# Patient Record
Sex: MALE | Employment: FULL TIME | URBAN - METROPOLITAN AREA
[De-identification: names, ages, dates, MRNs, and addresses within clinical notes are randomized per-mention and may not be internally consistent; named-entity substitution may affect disease eponyms.]

---

## 2023-05-11 ENCOUNTER — TELEMEDICINE (OUTPATIENT)
Dept: PRIMARY CARE | Facility: CLINIC | Age: 68
End: 2023-05-11
Payer: MEDICARE

## 2023-05-11 DIAGNOSIS — J06.9 UPPER RESPIRATORY TRACT INFECTION, UNSPECIFIED TYPE: Primary | ICD-10-CM

## 2023-05-11 PROCEDURE — 99442 PR PHYS/QHP TELEPHONE EVALUATION 11-20 MIN: CPT | Performed by: INTERNAL MEDICINE

## 2023-05-11 RX ORDER — LEVOCETIRIZINE DIHYDROCHLORIDE 5 MG/1
5 TABLET, FILM COATED ORAL EVERY EVENING
Qty: 14 TABLET | Refills: 1 | Status: SHIPPED | OUTPATIENT
Start: 2023-05-11 | End: 2023-09-11 | Stop reason: ALTCHOICE

## 2023-05-11 RX ORDER — CEFDINIR 300 MG/1
300 CAPSULE ORAL 2 TIMES DAILY
Qty: 14 CAPSULE | Refills: 0 | Status: SHIPPED | OUTPATIENT
Start: 2023-05-11 | End: 2023-05-18

## 2023-05-11 NOTE — PROGRESS NOTES
Subjective   Patient ID: Paco Gunderson is a 67 y.o. male who presents for No chief complaint on file..    HPI patient is attended by phone visit because he is complaining of cough congestion facial pain and sinus headache going on for the past 2 days.  He has tried over-the-counter cough suppressant and decongestant without any significant improvement in his symptoms.  He denies any fever, chills, shortness of breath, chest tightness and wheezing.  He has history of left inguinal hernia, osteoarthritis, COVID-19 infection, renal insufficiency, premature ventricular contractions and BPH        Review of Systems   Constitutional: Negative.    HENT:  Positive for congestion.    Eyes: Negative.    Respiratory:  Positive for cough.    Cardiovascular: Negative.    Gastrointestinal: Negative.    Endocrine: Negative.    Genitourinary: Negative.    Musculoskeletal: Negative.    Skin: Negative.    Allergic/Immunologic: Negative.    Neurological: Negative.    Hematological: Negative.    Psychiatric/Behavioral: Negative.         Objective   There were no vitals taken for this visit.    Physical Examnot done    Assessment/Plan    patient is started on cefdinir, levocetirizine for upper respiratory infection.  He is advised to drink fluids and take Tylenol for fever.  He will continue other home medications and will notify the clinic for any worsening of his symptoms.

## 2023-05-13 ASSESSMENT — ENCOUNTER SYMPTOMS
CARDIOVASCULAR NEGATIVE: 1
EYES NEGATIVE: 1
NEUROLOGICAL NEGATIVE: 1
PSYCHIATRIC NEGATIVE: 1
CONSTITUTIONAL NEGATIVE: 1
GASTROINTESTINAL NEGATIVE: 1
COUGH: 1
ALLERGIC/IMMUNOLOGIC NEGATIVE: 1
ENDOCRINE NEGATIVE: 1
MUSCULOSKELETAL NEGATIVE: 1
HEMATOLOGIC/LYMPHATIC NEGATIVE: 1

## 2023-08-22 LAB
ANION GAP IN SER/PLAS: 13 MMOL/L (ref 10–20)
CALCIUM (MG/DL) IN SER/PLAS: 9.7 MG/DL (ref 8.6–10.6)
CARBON DIOXIDE, TOTAL (MMOL/L) IN SER/PLAS: 28 MMOL/L (ref 21–32)
CHLORIDE (MMOL/L) IN SER/PLAS: 105 MMOL/L (ref 98–107)
CREATININE (MG/DL) IN SER/PLAS: 1.32 MG/DL (ref 0.5–1.3)
GFR MALE: 58 ML/MIN/1.73M2
GLUCOSE (MG/DL) IN SER/PLAS: 76 MG/DL (ref 74–99)
POTASSIUM (MMOL/L) IN SER/PLAS: 5.2 MMOL/L (ref 3.5–5.3)
SODIUM (MMOL/L) IN SER/PLAS: 141 MMOL/L (ref 136–145)
UREA NITROGEN (MG/DL) IN SER/PLAS: 17 MG/DL (ref 6–23)

## 2023-09-11 ENCOUNTER — OFFICE VISIT (OUTPATIENT)
Dept: PRIMARY CARE | Facility: CLINIC | Age: 68
End: 2023-09-11
Payer: MEDICARE

## 2023-09-11 VITALS
BODY MASS INDEX: 23.5 KG/M2 | HEART RATE: 64 BPM | OXYGEN SATURATION: 95 % | TEMPERATURE: 98.7 F | SYSTOLIC BLOOD PRESSURE: 143 MMHG | HEIGHT: 76 IN | WEIGHT: 193 LBS | DIASTOLIC BLOOD PRESSURE: 75 MMHG

## 2023-09-11 DIAGNOSIS — M17.0 OSTEOARTHRITIS OF BOTH KNEES, UNSPECIFIED OSTEOARTHRITIS TYPE: ICD-10-CM

## 2023-09-11 DIAGNOSIS — Z12.5 SCREENING FOR PROSTATE CANCER: ICD-10-CM

## 2023-09-11 DIAGNOSIS — I49.3 PVC (PREMATURE VENTRICULAR CONTRACTION): ICD-10-CM

## 2023-09-11 DIAGNOSIS — Z00.00 ROUTINE GENERAL MEDICAL EXAMINATION AT HEALTH CARE FACILITY: Primary | ICD-10-CM

## 2023-09-11 DIAGNOSIS — I10 HYPERTENSION, UNSPECIFIED TYPE: ICD-10-CM

## 2023-09-11 DIAGNOSIS — K40.90 LEFT INGUINAL HERNIA: ICD-10-CM

## 2023-09-11 DIAGNOSIS — N40.1 BENIGN PROSTATIC HYPERPLASIA WITH URINARY FREQUENCY: ICD-10-CM

## 2023-09-11 DIAGNOSIS — Z13.6 SCREENING FOR CARDIOVASCULAR CONDITION: ICD-10-CM

## 2023-09-11 DIAGNOSIS — Z12.12 SCREENING FOR COLORECTAL CANCER: ICD-10-CM

## 2023-09-11 DIAGNOSIS — Z12.11 SCREENING FOR COLORECTAL CANCER: ICD-10-CM

## 2023-09-11 DIAGNOSIS — R35.0 BENIGN PROSTATIC HYPERPLASIA WITH URINARY FREQUENCY: ICD-10-CM

## 2023-09-11 PROCEDURE — G0439 PPPS, SUBSEQ VISIT: HCPCS | Performed by: INTERNAL MEDICINE

## 2023-09-11 PROCEDURE — 99214 OFFICE O/P EST MOD 30 MIN: CPT | Performed by: INTERNAL MEDICINE

## 2023-09-11 PROCEDURE — 1159F MED LIST DOCD IN RCRD: CPT | Performed by: INTERNAL MEDICINE

## 2023-09-11 PROCEDURE — 1126F AMNT PAIN NOTED NONE PRSNT: CPT | Performed by: INTERNAL MEDICINE

## 2023-09-11 PROCEDURE — 3078F DIAST BP <80 MM HG: CPT | Performed by: INTERNAL MEDICINE

## 2023-09-11 PROCEDURE — 1160F RVW MEDS BY RX/DR IN RCRD: CPT | Performed by: INTERNAL MEDICINE

## 2023-09-11 PROCEDURE — 1170F FXNL STATUS ASSESSED: CPT | Performed by: INTERNAL MEDICINE

## 2023-09-11 PROCEDURE — 3077F SYST BP >= 140 MM HG: CPT | Performed by: INTERNAL MEDICINE

## 2023-09-11 RX ORDER — DICLOFENAC SODIUM 10 MG/G
4 GEL TOPICAL 4 TIMES DAILY
Qty: 100 G | Refills: 1 | Status: SHIPPED | OUTPATIENT
Start: 2023-09-11 | End: 2024-06-06 | Stop reason: WASHOUT

## 2023-09-11 RX ORDER — TAMSULOSIN HYDROCHLORIDE 0.4 MG/1
0.4 CAPSULE ORAL DAILY
Qty: 30 CAPSULE | Refills: 5 | Status: SHIPPED | OUTPATIENT
Start: 2023-09-11 | End: 2024-05-18

## 2023-09-11 RX ORDER — OMEPRAZOLE 20 MG/1
20 CAPSULE, DELAYED RELEASE ORAL
Qty: 30 CAPSULE | Refills: 0 | Status: SHIPPED
Start: 2023-09-11 | End: 2024-06-06 | Stop reason: WASHOUT

## 2023-09-11 RX ORDER — LISINOPRIL 10 MG/1
1 TABLET ORAL DAILY
COMMUNITY
Start: 2023-08-22 | End: 2023-10-19

## 2023-09-11 RX ORDER — OMEPRAZOLE 40 MG/1
40 CAPSULE, DELAYED RELEASE ORAL DAILY
COMMUNITY
End: 2023-09-11 | Stop reason: DRUGHIGH

## 2023-09-11 RX ORDER — METOPROLOL TARTRATE 50 MG/1
50 TABLET ORAL 2 TIMES DAILY
COMMUNITY
Start: 2023-08-22

## 2023-09-11 ASSESSMENT — PATIENT HEALTH QUESTIONNAIRE - PHQ9
SUM OF ALL RESPONSES TO PHQ9 QUESTIONS 1 AND 2: 0
2. FEELING DOWN, DEPRESSED OR HOPELESS: NOT AT ALL
SUM OF ALL RESPONSES TO PHQ9 QUESTIONS 1 AND 2: 0
2. FEELING DOWN, DEPRESSED OR HOPELESS: NOT AT ALL
1. LITTLE INTEREST OR PLEASURE IN DOING THINGS: NOT AT ALL
1. LITTLE INTEREST OR PLEASURE IN DOING THINGS: NOT AT ALL

## 2023-09-11 ASSESSMENT — COLUMBIA-SUICIDE SEVERITY RATING SCALE - C-SSRS: 1. IN THE PAST MONTH, HAVE YOU WISHED YOU WERE DEAD OR WISHED YOU COULD GO TO SLEEP AND NOT WAKE UP?: NO

## 2023-09-11 ASSESSMENT — ACTIVITIES OF DAILY LIVING (ADL)
BATHING: INDEPENDENT
TAKING_MEDICATION: INDEPENDENT
MANAGING_FINANCES: INDEPENDENT
GROCERY_SHOPPING: INDEPENDENT
DOING_HOUSEWORK: INDEPENDENT
DRESSING: INDEPENDENT

## 2023-09-11 ASSESSMENT — LIFESTYLE VARIABLES
SKIP TO QUESTIONS 9-10: 0
HOW MANY STANDARD DRINKS CONTAINING ALCOHOL DO YOU HAVE ON A TYPICAL DAY: 1 OR 2
HOW OFTEN DO YOU HAVE SIX OR MORE DRINKS ON ONE OCCASION: LESS THAN MONTHLY
AUDIT-C TOTAL SCORE: 2
HOW OFTEN DO YOU HAVE A DRINK CONTAINING ALCOHOL: MONTHLY OR LESS

## 2023-09-11 NOTE — PROGRESS NOTES
"Subjective   Reason for Visit: Paco Gunderson is an 68 y.o. male here for a Medicare Wellness visit.          Reviewed all medications by prescribing practitioner or clinical pharmacist (such as prescriptions, OTCs, herbal therapies and supplements) and documented in the medical record.    HPI patient presents to clinic for follow-up visit on history of left inguinal hernia, osteoarthritis, COVID-19 infection, renal insufficiency, arthritis,PAC's/premature ventricular contractions and BPH .  He has been complaining of urinary frequency and nocturia for the past several months.  He is also complaining of bilateral knee discomfort regarding arthritis of his knees.  He is also here for Medicare annual wellness exam.  Laboratory studies done shows serum creatinine of 1.32 GFR of 58 mL/min    Patient Care Team:  Curt Calvo MD as PCP - General  Curt Calvo MD as PCP - [employer] PCP     Review of Systems   Constitutional: Negative.    HENT: Negative.     Eyes: Negative.    Respiratory: Negative.     Cardiovascular: Negative.    Gastrointestinal: Negative.    Endocrine: Negative.    Genitourinary: Negative.    Musculoskeletal:  Positive for arthralgias.   Skin: Negative.    Allergic/Immunologic: Negative.    Neurological: Negative.    Hematological: Negative.    Psychiatric/Behavioral: Negative.         Objective   Vitals:  /75   Pulse 64   Temp 37.1 °C (98.7 °F)   Ht 1.93 m (6' 4\")   Wt 87.5 kg (193 lb)   SpO2 95%   BMI 23.49 kg/m²       Physical Exam  Constitutional:       Appearance: Normal appearance. He is normal weight.   HENT:      Right Ear: Tympanic membrane normal.      Left Ear: Tympanic membrane and ear canal normal.      Nose: Nose normal.   Neck:      Vascular: No carotid bruit.   Cardiovascular:      Rate and Rhythm: Normal rate.   Pulmonary:      Effort: No respiratory distress.      Breath sounds: No stridor. No wheezing.   Abdominal:      Palpations: Abdomen is soft.      " Tenderness: There is no abdominal tenderness. There is no guarding or rebound.      Comments: Reducible large left inguinal hernia   Skin:     Coloration: Skin is not jaundiced.   Neurological:      General: No focal deficit present.      Mental Status: He is alert and oriented to person, place, and time.   Psychiatric:         Mood and Affect: Mood normal.         Assessment/Plan   Problem List Items Addressed This Visit    None  Patient will be referred to general surgery regarding left inguinal hernia.  He is started on Flomax for symptoms of BPH.  He will be also started on diclofenac gel regarding arthritis of his knees.  He will be scheduled for CT cardiac scoring regarding screening for cardiovascular disease. He will be scheduled for routine blood work.  He will continue other medications and will return to clinic in 6 months for follow-up visit.

## 2023-09-11 NOTE — PROGRESS NOTES
"Subjective   Patient ID: Paco Gunderson is a 68 y.o. male who presents for Medicare Annual Wellness Visit Initial, Knee Pain (Right. Would like knee surgery ), and Hernia (Would like hernia groin surgery ).    HPI     Review of Systems    Objective   /75   Pulse 64   Temp 37.1 °C (98.7 °F)   Ht 1.93 m (6' 4\")   Wt 87.5 kg (193 lb)   SpO2 95%   BMI 23.49 kg/m²     Physical Exam    Assessment/Plan          "

## 2023-09-15 ASSESSMENT — ENCOUNTER SYMPTOMS
CARDIOVASCULAR NEGATIVE: 1
GASTROINTESTINAL NEGATIVE: 1
ENDOCRINE NEGATIVE: 1
NEUROLOGICAL NEGATIVE: 1
ALLERGIC/IMMUNOLOGIC NEGATIVE: 1
CONSTITUTIONAL NEGATIVE: 1
EYES NEGATIVE: 1
PSYCHIATRIC NEGATIVE: 1
RESPIRATORY NEGATIVE: 1
HEMATOLOGIC/LYMPHATIC NEGATIVE: 1
ARTHRALGIAS: 1

## 2023-09-27 LAB — NONINV COLON CA DNA+OCC BLD SCRN STL QL: POSITIVE

## 2023-09-29 DIAGNOSIS — R19.5 POSITIVE COLORECTAL CANCER SCREENING USING COLOGUARD TEST: Primary | ICD-10-CM

## 2023-10-18 ENCOUNTER — LAB (OUTPATIENT)
Dept: LAB | Facility: LAB | Age: 68
End: 2023-10-18
Payer: MEDICARE

## 2023-10-18 ENCOUNTER — TELEPHONE (OUTPATIENT)
Dept: CARDIOLOGY | Facility: CLINIC | Age: 68
End: 2023-10-18
Payer: MEDICARE

## 2023-10-18 DIAGNOSIS — I10 BENIGN ESSENTIAL HYPERTENSION: Primary | ICD-10-CM

## 2023-10-18 DIAGNOSIS — I10 BENIGN ESSENTIAL HYPERTENSION: ICD-10-CM

## 2023-10-18 LAB
ANION GAP SERPL CALC-SCNC: 14 MMOL/L (ref 10–20)
BUN SERPL-MCNC: 22 MG/DL (ref 6–23)
CALCIUM SERPL-MCNC: 9.7 MG/DL (ref 8.6–10.6)
CHLORIDE SERPL-SCNC: 104 MMOL/L (ref 98–107)
CO2 SERPL-SCNC: 26 MMOL/L (ref 21–32)
CREAT SERPL-MCNC: 1.38 MG/DL (ref 0.5–1.3)
GFR SERPL CREATININE-BSD FRML MDRD: 55 ML/MIN/1.73M*2
GLUCOSE SERPL-MCNC: 126 MG/DL (ref 74–99)
POTASSIUM SERPL-SCNC: 4.2 MMOL/L (ref 3.5–5.3)
SODIUM SERPL-SCNC: 140 MMOL/L (ref 136–145)

## 2023-10-18 PROCEDURE — 80048 BASIC METABOLIC PNL TOTAL CA: CPT

## 2023-10-18 PROCEDURE — 36415 COLL VENOUS BLD VENIPUNCTURE: CPT

## 2023-10-18 NOTE — TELEPHONE ENCOUNTER
Lab results still not available. TCT patient to pass along Dr. Payne's recommendation for ER evaluation if true syncope. Patient states 'the ER is where people go to die'. Offered him an office visit tomorrow as patient is going out of town on Friday. Patient prefers to see Dr. Payne tomorrow in clinic. Directions given. Will review lab results at that time

## 2023-10-18 NOTE — TELEPHONE ENCOUNTER
"TCT patient who states he woke up at 5am but never 'really woke up\", he tried to get out of bed but felt weak and crawled to the door, calling for help. States when he reached the hallway and the light, he started to feel better. Denies nightmares or sleep walking. Notes reviewed. Patient never had repeat BMP drawn after starting lisinopril. Advised he get blood work today. States understanding and agreement. Orders forwarded to Dr. Payne to send  "

## 2023-10-18 NOTE — TELEPHONE ENCOUNTER
"Pt called today stating that he had an episode of \"blacking out\" he said he didn't know what to do     He is asking if meds need adjusted  "

## 2023-10-19 ENCOUNTER — OFFICE VISIT (OUTPATIENT)
Dept: CARDIOLOGY | Facility: HOSPITAL | Age: 68
End: 2023-10-19
Payer: MEDICARE

## 2023-10-19 VITALS
BODY MASS INDEX: 23.62 KG/M2 | SYSTOLIC BLOOD PRESSURE: 159 MMHG | WEIGHT: 194 LBS | HEIGHT: 76 IN | DIASTOLIC BLOOD PRESSURE: 80 MMHG | HEART RATE: 70 BPM

## 2023-10-19 DIAGNOSIS — I10 BENIGN ESSENTIAL HYPERTENSION: Primary | ICD-10-CM

## 2023-10-19 DIAGNOSIS — Z00.00 HEALTHCARE MAINTENANCE: ICD-10-CM

## 2023-10-19 PROCEDURE — 99213 OFFICE O/P EST LOW 20 MIN: CPT | Performed by: HOSPITALIST

## 2023-10-19 PROCEDURE — 1036F TOBACCO NON-USER: CPT | Performed by: HOSPITALIST

## 2023-10-19 PROCEDURE — 3079F DIAST BP 80-89 MM HG: CPT | Performed by: HOSPITALIST

## 2023-10-19 PROCEDURE — 1160F RVW MEDS BY RX/DR IN RCRD: CPT | Performed by: HOSPITALIST

## 2023-10-19 PROCEDURE — 3077F SYST BP >= 140 MM HG: CPT | Performed by: HOSPITALIST

## 2023-10-19 PROCEDURE — 1126F AMNT PAIN NOTED NONE PRSNT: CPT | Performed by: HOSPITALIST

## 2023-10-19 PROCEDURE — 1159F MED LIST DOCD IN RCRD: CPT | Performed by: HOSPITALIST

## 2023-10-19 RX ORDER — LISINOPRIL 20 MG/1
20 TABLET ORAL DAILY
Qty: 30 TABLET | Refills: 11 | Status: SHIPPED | OUTPATIENT
Start: 2023-10-19 | End: 2024-06-06 | Stop reason: WASHOUT

## 2023-10-19 NOTE — PATIENT INSTRUCTIONS
Thank you much for visiting us today.    Your incident yesterday is not cardiac in origin.  Please talk to your primary care physician.    Your blood pressure still elevated, we are going to increase your lisinopril to 20 mg once daily.    We will see you back in 1 year.  Please call our office at [phone] if you have any questions.  We are going to check your lipid panel [cholesterol].

## 2023-10-31 DIAGNOSIS — R12 HEARTBURN: ICD-10-CM

## 2023-10-31 RX ORDER — OMEPRAZOLE 40 MG/1
40 CAPSULE, DELAYED RELEASE ORAL DAILY
Qty: 90 CAPSULE | Refills: 2 | Status: SHIPPED | OUTPATIENT
Start: 2023-10-31 | End: 2023-11-29 | Stop reason: ALTCHOICE

## 2023-11-29 ENCOUNTER — OFFICE VISIT (OUTPATIENT)
Dept: GASTROENTEROLOGY | Facility: CLINIC | Age: 68
End: 2023-11-29
Payer: MEDICARE

## 2023-11-29 VITALS
SYSTOLIC BLOOD PRESSURE: 164 MMHG | HEIGHT: 76 IN | WEIGHT: 194 LBS | BODY MASS INDEX: 23.62 KG/M2 | DIASTOLIC BLOOD PRESSURE: 62 MMHG | HEART RATE: 81 BPM

## 2023-11-29 DIAGNOSIS — R19.5 POSITIVE COLORECTAL CANCER SCREENING USING COLOGUARD TEST: Primary | ICD-10-CM

## 2023-11-29 PROCEDURE — 1126F AMNT PAIN NOTED NONE PRSNT: CPT | Performed by: NURSE PRACTITIONER

## 2023-11-29 PROCEDURE — 3078F DIAST BP <80 MM HG: CPT | Performed by: NURSE PRACTITIONER

## 2023-11-29 PROCEDURE — 1036F TOBACCO NON-USER: CPT | Performed by: NURSE PRACTITIONER

## 2023-11-29 PROCEDURE — 1160F RVW MEDS BY RX/DR IN RCRD: CPT | Performed by: NURSE PRACTITIONER

## 2023-11-29 PROCEDURE — 1159F MED LIST DOCD IN RCRD: CPT | Performed by: NURSE PRACTITIONER

## 2023-11-29 PROCEDURE — 3077F SYST BP >= 140 MM HG: CPT | Performed by: NURSE PRACTITIONER

## 2023-11-29 PROCEDURE — 99203 OFFICE O/P NEW LOW 30 MIN: CPT | Performed by: NURSE PRACTITIONER

## 2023-11-29 NOTE — PROGRESS NOTES
Subjective   Patient ID: Paco Gunderson is a 68 y.o. male who presents for Positive Cologuard .    68 year old male who is here for positive cologuard. He has never had a Colonoscopy before. Reports that he occasionally sees blood in his stool when he wipes and attributes that to hemorrhoids. He reports no change in bowel habits, abdominal pain, unintentional weight loss. His father was in his late 80s when he passed, due to multiple reasons such as cardiac and alzheimers but reports there was potentially colon cancer as well.     @Abdominal pain-No      Bloating-No  Abdominal swelling-No     Burping-No       Trouble swallowing-No      Painful swallowing-No     Feeling full after small meal-No     Heartburn-Yes (occ - takes Omeprazole)     Nausea-No       Regurgitation-No  Vomiting-No  Vomiting blood-N/A  Vomiting coffee grounds-N/A  Constipation-No  Diarrhea-No  Fecal incontinence-No  Fecal urgency-No   BRBPR-Yes  Black stools-No  Maroon stools-No   Unintentional weight loss-No   Have you had a Colonoscopy-No   Have you had an EGD-No     Objective   Physical Exam  @Constitutional: well nourished, well appearing. NAD. Alert and cooperative  Skin: no jaundice   Eyes: anicteric, normal conjunctiva  ENT: MMM  Pulmonary: easy and nonlabored on RA  Abdomen: soft, NT, ND. No ascites.  MSK: MAEx4  Extremities: no edema  Neuro: aaox3  Psych: appropriate mood and behavior     No visits with results within 1 Month(s) from this visit.   Latest known visit with results is:   Lab on 10/18/2023   Component Date Value Ref Range Status    Glucose 10/18/2023 126 (H)  74 - 99 mg/dL Final    Sodium 10/18/2023 140  136 - 145 mmol/L Final    Potassium 10/18/2023 4.2  3.5 - 5.3 mmol/L Final    Chloride 10/18/2023 104  98 - 107 mmol/L Final    Bicarbonate 10/18/2023 26  21 - 32 mmol/L Final    Anion Gap 10/18/2023 14  10 - 20 mmol/L Final    Urea Nitrogen 10/18/2023 22  6 - 23 mg/dL Final    Creatinine 10/18/2023 1.38 (H)  0.50 - 1.30  mg/dL Final    eGFR 10/18/2023 55 (L)  >60 mL/min/1.73m*2 Final    Calculations of estimated GFR are performed using the 2021 CKD-EPI Study Refit equation without the race variable for the IDMS-Traceable creatinine methods.  https://jasn.asnjournals.org/content/early/2021/09/22/ASN.6849218904    Calcium 10/18/2023 9.7  8.6 - 10.6 mg/dL Final       Assessment/Plan   68 year old male here with positive Cologuard test.     -Colonoscopy ordered at [facility], pt request

## 2023-11-30 DIAGNOSIS — R19.5 POSITIVE COLORECTAL CANCER SCREENING USING COLOGUARD TEST: Primary | ICD-10-CM

## 2023-11-30 RX ORDER — POLYETHYLENE GLYCOL 3350, SODIUM SULFATE ANHYDROUS, SODIUM BICARBONATE, SODIUM CHLORIDE, POTASSIUM CHLORIDE 236; 22.74; 6.74; 5.86; 2.97 G/4L; G/4L; G/4L; G/4L; G/4L
4000 POWDER, FOR SOLUTION ORAL ONCE
Qty: 4000 ML | Refills: 0 | Status: SHIPPED | OUTPATIENT
Start: 2023-11-30 | End: 2023-11-30

## 2023-12-11 ENCOUNTER — APPOINTMENT (OUTPATIENT)
Dept: GASTROENTEROLOGY | Facility: CLINIC | Age: 68
End: 2023-12-11
Payer: MEDICARE

## 2023-12-21 ENCOUNTER — APPOINTMENT (OUTPATIENT)
Dept: GASTROENTEROLOGY | Facility: CLINIC | Age: 68
End: 2023-12-21
Payer: MEDICARE

## 2024-01-23 ENCOUNTER — TELEPHONE (OUTPATIENT)
Dept: PRIMARY CARE | Facility: CLINIC | Age: 69
End: 2024-01-23
Payer: MEDICARE

## 2024-01-23 DIAGNOSIS — M17.0 ARTHRITIS OF BOTH KNEES: Primary | ICD-10-CM

## 2024-02-06 ENCOUNTER — HOSPITAL ENCOUNTER (OUTPATIENT)
Dept: RADIOLOGY | Facility: CLINIC | Age: 69
Discharge: HOME | End: 2024-02-06
Payer: MEDICARE

## 2024-02-06 ENCOUNTER — OFFICE VISIT (OUTPATIENT)
Dept: ORTHOPEDIC SURGERY | Facility: CLINIC | Age: 69
End: 2024-02-06
Payer: MEDICARE

## 2024-02-06 DIAGNOSIS — M25.561 PAIN IN BOTH KNEES, UNSPECIFIED CHRONICITY: ICD-10-CM

## 2024-02-06 DIAGNOSIS — M25.562 PAIN IN BOTH KNEES, UNSPECIFIED CHRONICITY: ICD-10-CM

## 2024-02-06 DIAGNOSIS — M17.0 PRIMARY OSTEOARTHRITIS OF BOTH KNEES: Primary | ICD-10-CM

## 2024-02-06 PROCEDURE — 99203 OFFICE O/P NEW LOW 30 MIN: CPT | Performed by: STUDENT IN AN ORGANIZED HEALTH CARE EDUCATION/TRAINING PROGRAM

## 2024-02-06 PROCEDURE — 1126F AMNT PAIN NOTED NONE PRSNT: CPT | Performed by: STUDENT IN AN ORGANIZED HEALTH CARE EDUCATION/TRAINING PROGRAM

## 2024-02-06 PROCEDURE — 73564 X-RAY EXAM KNEE 4 OR MORE: CPT | Mod: 50

## 2024-02-06 PROCEDURE — 1036F TOBACCO NON-USER: CPT | Performed by: STUDENT IN AN ORGANIZED HEALTH CARE EDUCATION/TRAINING PROGRAM

## 2024-02-07 NOTE — PROGRESS NOTES
Paco Gunderson is a 68 y.o.  year-old  female. he is a new patient to our office and presents with a chief complaint of Bilateral knee pain. Symptoms began years ago, progressive in nature. he pain is worse with activity. They describe the symptoms as pain and swelling. Treatment to date has been ice/nsaids/activity modification without adequate relief.  He notes someone told him 20 years ago that he needs a knee replacement but has not had any other formal treatment aside from those above.      Past Medical, Family, and Social History reviewed and inlcude:[none] all other history pertinent to the presenting problem  Review of Systems  A complete review of systems was conducted, pertinent only to the HPI noted above.  Constitutional: None  Eyes: No additions to above history  Ears, Nose, Throat: No additions to above history  Cardiovascular: No additions to above history  Respiratory: No additions to above history  GI: No additions to above history  : No additions to above history  Skin/Neuro: No additions to above history  Endocrine/Heme/Lymph: No additions to above history  Immunologic: No additions to above history  Psychiatric: No additions to above history  Musculoskeletal: see above  GEN: Alert and Oriented x 3  Constitutional: Well appearing [male], in no apparent distress.  Eyes: sclera anicteric  ENT: hearing appropriate for normal conversation, neck appears symmetric with no gross thyromegaly  Pulm: No labored breathing, no wheezing  CVS: Regular rate and rhythm  Skin: No rashes, erythema, or induration around knee    MUSCULOSKELETAL EXAM:     Bilateral KNEE:  ROM:  [0]/ [0] / 120  Effusion: [none]  Alignment: [neutral]      Sensation intact bilaterally sural/saphenous/sp/dp/tibial nerve bilaterally  Motor 5/5 knee flexion/extension/foot DF/PF/EHL/FHL bilaterally  Palpable/symmetric DP and PT pulse bilaterally      PATELLAR/EXTENSOR MECHANISM:  KNEE:  Patellar Crepitus: yes  Patellar Compression  Pain:yes  Patellar Apprehension: [no]  Extensor Mechanism: [intact]  Straight Leg Raise: fair      LIGAMENTS:  ACL: Lachmans: [negative]  PCL: [stable]  Valgus at 0 degrees: [stable]  Valgus at 30 degrees: [stable]  Varus at 0 degrees: [stable]  Varus at 30 degrees: [stable]    MENISCUS EXAM:  Joint Line Tenderness:medial joint line tenderness  McMurrays: [negative]  Pain with Deep Flexion: [No]    Xrays independently interpreted and reviewed: Advanced bilateral knee osteoarthritis    The patient history, physical examination and imaging studies are consistent with knee arthritis. The treatment options including NSAIDs, activity modification, PT (including the importance of obtaining full motion), and weight loss were discussed at length today. I have also suggested a potential for IA injection with cortisone but he would like to hold off at this moment.  I have discussed the potential need for arthroplasty in the future. The patient acknowledged the current plan.     He may follow-up if he desires corticosteroid injections at any time.

## 2024-02-20 ENCOUNTER — APPOINTMENT (OUTPATIENT)
Dept: CARDIOLOGY | Facility: CLINIC | Age: 69
End: 2024-02-20
Payer: MEDICARE

## 2024-02-22 ENCOUNTER — APPOINTMENT (OUTPATIENT)
Dept: ORTHOPEDIC SURGERY | Facility: CLINIC | Age: 69
End: 2024-02-22
Payer: MEDICARE

## 2024-03-11 ENCOUNTER — APPOINTMENT (OUTPATIENT)
Dept: PRIMARY CARE | Facility: CLINIC | Age: 69
End: 2024-03-11
Payer: MEDICARE

## 2024-03-12 ENCOUNTER — HOSPITAL ENCOUNTER (OUTPATIENT)
Dept: OPERATING ROOM | Facility: CLINIC | Age: 69
Setting detail: OUTPATIENT SURGERY
Discharge: HOME | End: 2024-03-12
Payer: MEDICARE

## 2024-03-12 VITALS
WEIGHT: 192.9 LBS | SYSTOLIC BLOOD PRESSURE: 121 MMHG | BODY MASS INDEX: 23.49 KG/M2 | OXYGEN SATURATION: 95 % | HEIGHT: 76 IN | DIASTOLIC BLOOD PRESSURE: 75 MMHG | RESPIRATION RATE: 16 BRPM | TEMPERATURE: 98.6 F | HEART RATE: 66 BPM

## 2024-03-12 DIAGNOSIS — R19.5 POSITIVE COLORECTAL CANCER SCREENING USING COLOGUARD TEST: Primary | ICD-10-CM

## 2024-03-12 PROCEDURE — 3700000012 HC SEDATION LEVEL 5+ TIME - INITIAL 15 MINUTES 5/> YEARS

## 2024-03-12 PROCEDURE — 7100000010 HC PHASE TWO TIME - EACH INCREMENTAL 1 MINUTE

## 2024-03-12 PROCEDURE — 45385 COLONOSCOPY W/LESION REMOVAL: CPT | Performed by: INTERNAL MEDICINE

## 2024-03-12 PROCEDURE — 3700000013 HC SEDATION LEVEL 5+ TIME - EACH ADDITIONAL 15 MINUTES

## 2024-03-12 PROCEDURE — 3600000002 HC OR TIME - INITIAL BASE CHARGE - PROCEDURE LEVEL TWO

## 2024-03-12 PROCEDURE — 7100000009 HC PHASE TWO TIME - INITIAL BASE CHARGE

## 2024-03-12 PROCEDURE — 88305 TISSUE EXAM BY PATHOLOGIST: CPT | Performed by: PATHOLOGY

## 2024-03-12 PROCEDURE — 2500000004 HC RX 250 GENERAL PHARMACY W/ HCPCS (ALT 636 FOR OP/ED): Performed by: INTERNAL MEDICINE

## 2024-03-12 PROCEDURE — 88305 TISSUE EXAM BY PATHOLOGIST: CPT | Mod: TC,59,SUR | Performed by: INTERNAL MEDICINE

## 2024-03-12 PROCEDURE — 3600000007 HC OR TIME - EACH INCREMENTAL 1 MINUTE - PROCEDURE LEVEL TWO

## 2024-03-12 RX ORDER — FENTANYL CITRATE 50 UG/ML
INJECTION, SOLUTION INTRAMUSCULAR; INTRAVENOUS AS NEEDED
Status: COMPLETED | OUTPATIENT
Start: 2024-03-12 | End: 2024-03-12

## 2024-03-12 RX ORDER — MIDAZOLAM HYDROCHLORIDE 1 MG/ML
INJECTION, SOLUTION INTRAMUSCULAR; INTRAVENOUS AS NEEDED
Status: COMPLETED | OUTPATIENT
Start: 2024-03-12 | End: 2024-03-12

## 2024-03-12 RX ORDER — SODIUM CHLORIDE, SODIUM LACTATE, POTASSIUM CHLORIDE, CALCIUM CHLORIDE 600; 310; 30; 20 MG/100ML; MG/100ML; MG/100ML; MG/100ML
20 INJECTION, SOLUTION INTRAVENOUS CONTINUOUS
Status: CANCELLED | OUTPATIENT
Start: 2024-03-12

## 2024-03-12 RX ADMIN — MIDAZOLAM 1 MG: 1 INJECTION INTRAMUSCULAR; INTRAVENOUS at 09:56

## 2024-03-12 RX ADMIN — MIDAZOLAM 1 MG: 1 INJECTION INTRAMUSCULAR; INTRAVENOUS at 09:46

## 2024-03-12 RX ADMIN — FENTANYL CITRATE 25 MCG: 50 INJECTION, SOLUTION INTRAMUSCULAR; INTRAVENOUS at 09:46

## 2024-03-12 RX ADMIN — MIDAZOLAM 2 MG: 1 INJECTION INTRAMUSCULAR; INTRAVENOUS at 09:41

## 2024-03-12 RX ADMIN — FENTANYL CITRATE 50 MCG: 50 INJECTION, SOLUTION INTRAMUSCULAR; INTRAVENOUS at 09:39

## 2024-03-12 RX ADMIN — FENTANYL CITRATE 50 MCG: 50 INJECTION, SOLUTION INTRAMUSCULAR; INTRAVENOUS at 09:41

## 2024-03-12 RX ADMIN — MIDAZOLAM 2 MG: 1 INJECTION INTRAMUSCULAR; INTRAVENOUS at 09:39

## 2024-03-12 RX ADMIN — FENTANYL CITRATE 25 MCG: 50 INJECTION, SOLUTION INTRAMUSCULAR; INTRAVENOUS at 09:56

## 2024-03-12 ASSESSMENT — COLUMBIA-SUICIDE SEVERITY RATING SCALE - C-SSRS
6. HAVE YOU EVER DONE ANYTHING, STARTED TO DO ANYTHING, OR PREPARED TO DO ANYTHING TO END YOUR LIFE?: NO
2. HAVE YOU ACTUALLY HAD ANY THOUGHTS OF KILLING YOURSELF?: NO
1. IN THE PAST MONTH, HAVE YOU WISHED YOU WERE DEAD OR WISHED YOU COULD GO TO SLEEP AND NOT WAKE UP?: NO

## 2024-03-12 ASSESSMENT — PAIN SCALES - GENERAL
PAINLEVEL_OUTOF10: 0 - NO PAIN

## 2024-03-12 ASSESSMENT — PAIN - FUNCTIONAL ASSESSMENT: PAIN_FUNCTIONAL_ASSESSMENT: 0-10

## 2024-03-12 NOTE — DISCHARGE INSTRUCTIONS
Patient Instructions after a Colonoscopy      The anesthetics, sedatives or narcotics which were given to you today will be acting in your body for the next 24 hours, so you might feel a little sleepy or groggy.  This feeling should slowly wear off. Carefully read and follow the instructions.     You received sedation today:  - Do not drive or operate any machinery or power tools of any kind.   - No alcoholic beverages today, not even beer or wine.  - Do not make any important decisions or sign any legal documents.  - No over the counter medications that contain alcohol or that may cause drowsiness.  - Do not make any important decisions or sign any legal documents.    While it is common to experience mild to moderate abdominal distention, gas, or belching after your procedure, if any of these symptoms occur following discharge from the GI Lab or within one week of having your procedure, call the [facility] to be advised whether a visit to your nearest Urgent Care or Emergency Department is indicated.  Take this paper with you if you go.     - If you develop an allergic reaction to the medications that were given during your procedure such as difficulty breathing, rash, hives, severe nausea, vomiting or lightheadedness.  - If you experience chest pain, shortness of breath, severe abdominal pain, fevers and chills.  -If you develop signs and symptoms of bleeding such as blood in your spit, if your stools turn black, tarry, or bloody  - If you have not urinated within 8 hours following your procedure.  - If your IV site becomes painful, red, inflamed, or looks infected.    If you received a biopsy/polypectomy/sphincterotomy the following instructions apply below:    __ Do not use Aspirin containing products, non-steroidal medications or anti-coagulants for one week following your procedure. (Examples of these types of medications are: Advil, Arthrotec, Aleve, Coumadin, Ecotrin, Heparin, Ibuprofen,  Indocin, Motrin, Naprosyn, Nuprin, Plavix, Vioxx, and Voltarin, or their generic forms.  This list is not all-inclusive.  Check with your physician or pharmacist before resuming medications.)   __ Eat a soft diet today.  Avoid foods that are poorly digested for the next 24 hours.  These foods would include: nuts, beans, lettuce, red meats, and fried foods. Start with liquids and advance your diet as tolerated, gradually work up to eating solids.   __ Do not have a Barium Study or Enema for one week.    Your physician recommends the additional following instructions:    -You have a contact number available for emergencies. The signs and symptoms of potential delayed complications were discussed with you. You may return to normal activities tomorrow.  -Resume your previous diet.  -Continue your present medications.   -We are waiting for your pathology results.  -Your physician has recommended a repeat colonoscopy (date to be determined after pending pathology results are reviewed) for surveillance based on pathology results.  -The findings and recommendations have been discussed with you.  -The findings and recommendations were discussed with your family.  - Please see Medication Reconciliation Form for new medication/medications prescribed.       If you experience any problems or have any questions following discharge from the GI Lab, please call: [phone]

## 2024-03-12 NOTE — H&P
"History Of Present Illness  Paco Gunderson is a 68 y.o. male presenting with + Cologuard for colonoscopy.     Past Medical History  Past Medical History:   Diagnosis Date    Encounter for general adult medical examination without abnormal findings 12/28/2020    Encounter for preventive health examination    Personal history of other diseases of male genital organs     History of prostate disorder    Personal history of other diseases of the musculoskeletal system and connective tissue     History of arthritis    Personal history of other specified conditions 11/01/2022    History of palpitations     Surgical History  Past Surgical History:   Procedure Laterality Date    HERNIA REPAIR  01/09/2018    Hernia Repair    OTHER SURGICAL HISTORY  02/13/2018    Primary Repair Of Knee Ligament Cruciate Posterior     Social History  He reports that he quit smoking about 41 years ago. His smoking use included cigarettes. He has never used smokeless tobacco. He reports current alcohol use. He reports that he does not use drugs.    Family History  Father - suspected colon cancer     Allergies  No Known Allergies  Review of Systems  A 10+ point review of systems was completed and otherwise negative.    Pre-sedation Evaluation:  ASA Classification - ASA 2 - Patient with mild systemic disease with no functional limitations  Mallampati Score - II (hard and soft palate, upper portion of tonsils anduvula visible)    Physical Exam  CONSTITUTIONAL: no acute distress, appears stated age  PULMONARY: clear to auscultation bilaterally  CARDIOVASCULAR: regular rate and rhythm  ABDOMEN: soft, non-tender  NEUROLOGIC: alert and oriented to person/place/time       Last Recorded Vitals  Blood pressure 159/78, pulse 77, temperature 36.5 °C (97.7 °F), temperature source Temporal, resp. rate 17, height 1.93 m (6' 4\"), weight 87.5 kg (192 lb 14.4 oz), SpO2 96 %.     Assessment/Plan   Will proceed with colonoscopy for further evaluation of positive " cologuard     PTA/Current Medications:  (Not in a hospital admission)    Current Outpatient Medications   Medication Sig Dispense Refill    diclofenac sodium (Voltaren) 1 % gel gel Apply 1 Application topically 4 times a day. 100 g 1    lisinopril 20 mg tablet Take 1 tablet (20 mg) by mouth once daily. 30 tablet 11    metoprolol tartrate (Lopressor) 25 mg tablet Take 1 tablet (25 mg) by mouth 2 times a day.      tamsulosin (Flomax) 0.4 mg 24 hr capsule Take 1 capsule (0.4 mg) by mouth once daily. (Patient taking differently: Take 1 capsule (0.4 mg) by mouth 2 times a week.) 30 capsule 5    omeprazole (PriLOSEC) 20 mg DR capsule Take 1 capsule (20 mg) by mouth once daily in the morning. Take before meals. 30 capsule 0     No current facility-administered medications for this encounter.     Uzair Mckeon MD   mother

## 2024-03-13 ASSESSMENT — PAIN SCALES - GENERAL: PAINLEVEL_OUTOF10: 0 - NO PAIN

## 2024-03-21 LAB
LABORATORY COMMENT REPORT: NORMAL
PATH REPORT.FINAL DX SPEC: NORMAL
PATH REPORT.GROSS SPEC: NORMAL
PATH REPORT.TOTAL CANCER: NORMAL

## 2024-03-21 NOTE — RESULT ENCOUNTER NOTE
The polyps removed from your colon were adenomas (benign but precancerous).  The recommendation is to repeat colonoscopy in 3 years.      Uzair Mckeon MD

## 2024-04-01 ENCOUNTER — OFFICE VISIT (OUTPATIENT)
Dept: SURGERY | Facility: CLINIC | Age: 69
End: 2024-04-01
Payer: MEDICARE

## 2024-04-01 VITALS
DIASTOLIC BLOOD PRESSURE: 75 MMHG | OXYGEN SATURATION: 98 % | BODY MASS INDEX: 24.1 KG/M2 | WEIGHT: 198 LBS | SYSTOLIC BLOOD PRESSURE: 125 MMHG | HEART RATE: 77 BPM | TEMPERATURE: 98.4 F

## 2024-04-01 DIAGNOSIS — K40.91 RECURRENT INGUINAL HERNIA OF LEFT SIDE WITHOUT OBSTRUCTION OR GANGRENE: Primary | ICD-10-CM

## 2024-04-01 PROCEDURE — 99213 OFFICE O/P EST LOW 20 MIN: CPT | Performed by: SURGERY

## 2024-04-01 PROCEDURE — 3074F SYST BP LT 130 MM HG: CPT | Performed by: SURGERY

## 2024-04-01 PROCEDURE — 1159F MED LIST DOCD IN RCRD: CPT | Performed by: SURGERY

## 2024-04-01 PROCEDURE — 3078F DIAST BP <80 MM HG: CPT | Performed by: SURGERY

## 2024-04-01 PROCEDURE — 1126F AMNT PAIN NOTED NONE PRSNT: CPT | Performed by: SURGERY

## 2024-04-01 ASSESSMENT — ENCOUNTER SYMPTOMS: DEPRESSION: 0

## 2024-04-01 ASSESSMENT — PAIN SCALES - GENERAL: PAINLEVEL: 0-NO PAIN

## 2024-04-01 NOTE — PROGRESS NOTES
"Subjective   Patient ID: Paco Gunderson is a 68 y.o. male who presents for [facility] for Hernia ..  HPI  Patient is a 68-year-old gentleman who is referred for evaluation and treatment of the left inguinal hernia.  Has history of undergoing bilateral laparoscopic inguinal hernia repair back in 2000.   For several years he has noted to have a recurrence of his left inguinal hernia.  Fairly asymptomatic.  Recently did undergo colonoscopy and he states that after the colonoscopy the hernia has become more prominent.  Also he has a history of a left-sided varicocele diagnosed as a teenager.  His only symptoms from this was left testicle that \"hung very low \".  Since the colonoscopy his left testicle has retracted to be just about the same height as the right testicle.        Review of Systems     On review of systems patient denies any weight loss, fever, change in bowel habits, melena, hematochezia, coronary artery disease, diabetes,  , TIA/CVA, bleeding, jaundice, pancreatitis, hepatitis.    Patient does not smoke. Patient does not drink      Works in      Past Medical History:   Diagnosis Date    Encounter for general adult medical examination without abnormal findings 12/28/2020    Encounter for preventive health examination    Personal history of other diseases of male genital organs     History of prostate disorder    Personal history of other diseases of the musculoskeletal system and connective tissue     History of arthritis    Personal history of other specified conditions 11/01/2022    History of palpitations    PMH of HTN, COVID x2 last in 05/2022, BPH, arthritis, CKD, PACs/PVCs, and GERD, who saw me     Past Surgical History:   Procedure Laterality Date    HERNIA REPAIR  01/09/2018    Hernia Repair    OTHER SURGICAL HISTORY  02/13/2018    Primary Repair Of Knee Ligament Cruciate Posterior    Lap bilateral  inguinal hernia repair with mesh 2000.  He brings a picture that was taken at the time " "of the surgery on the left side showing what appears to be a large direct inguinal hernia and another picture showing large piece of mesh with some metal screws tacks        Objective     Physical Exam    Well-nourished, well-developed. In no distress  Alert and oriented x 3  Lungs are clear to auscultation bilaterally.  Cardiac exam is regular rhythm and rate.  Abdomen is soft nontender nondistended.  Neurologic exam is without focal deficit.  Small to moderate-sized recurrent left inguinal hernia.  Reduces easily.  Otherwise genitalia to include testicles are normally positioned  Assessment/Plan     Impression: Recurrent left inguinal hernia.  Symptoms are vague but no pain.  He has been living comfortably with the hernia with a truss.    I did recommend and offer robotic recurrent left inguinal hernia repair with mesh.  We discussed the procedure in detail.  At this time he is not terribly interested in surgery and states \"I do not want to do it \".  Feel that a watchful waiting approach would not be unreasonable given the fact he is asymptomatic.  I have asked him to follow-up with me in 4 months, or sooner should he change his mind regarding surgery.       "

## 2024-04-01 NOTE — LETTER
"April 1, 2024     Curt Calvo MD  [address] [place] [address]   [place] [facility], [address]  [place] [address]    Patient: Paco Gunderson   YOB: 1955   Date of Visit: 4/1/2024       Dear Dr. Curt Calvo MD:    Thank you for referring Paco Gunderson to me for evaluation. Below are my notes for this consultation.  If you have questions, please do not hesitate to call me. I look forward to following your patient along with you.       Sincerely,     Nathan Murrieta MD      CC: No Recipients  ______________________________________________________________________________________    Subjective  Patient ID: Paco Gunderson is a 68 y.o. male who presents for [facility] for Hernia ..  HPI  Patient is a 68-year-old gentleman who is referred for evaluation and treatment of the left inguinal hernia.  Has history of undergoing bilateral laparoscopic inguinal hernia repair back in 2000.   For several years he has noted to have a recurrence of his left inguinal hernia.  Fairly asymptomatic.  Recently did undergo colonoscopy and he states that after the colonoscopy the hernia has become more prominent.  Also he has a history of a left-sided varicocele diagnosed as a teenager.  His only symptoms from this was left testicle that \"hung very low \".  Since the colonoscopy his left testicle has retracted to be just about the same height as the right testicle.        Review of Systems     On review of systems patient denies any weight loss, fever, change in bowel habits, melena, hematochezia, coronary artery disease, diabetes,  , TIA/CVA, bleeding, jaundice, pancreatitis, hepatitis.    Patient does not smoke. Patient does not drink      Works in      Past Medical History:   Diagnosis Date   • Encounter for general adult medical examination without abnormal findings 12/28/2020    Encounter for preventive health examination   • Personal history of other diseases of male genital organs     History of prostate " "disorder   • Personal history of other diseases of the musculoskeletal system and connective tissue     History of arthritis   • Personal history of other specified conditions 11/01/2022    History of palpitations    PMH of HTN, COVID x2 last in 05/2022, BPH, arthritis, CKD, PACs/PVCs, and GERD, who saw me     Past Surgical History:   Procedure Laterality Date   • HERNIA REPAIR  01/09/2018    Hernia Repair   • OTHER SURGICAL HISTORY  02/13/2018    Primary Repair Of Knee Ligament Cruciate Posterior    Lap bilateral  inguinal hernia repair with mesh 2000.  He brings a picture that was taken at the time of the surgery on the left side showing what appears to be a large direct inguinal hernia and another picture showing large piece of mesh with some metal screws tacks        Objective    Physical Exam    Well-nourished, well-developed. In no distress  Alert and oriented x 3  Lungs are clear to auscultation bilaterally.  Cardiac exam is regular rhythm and rate.  Abdomen is soft nontender nondistended.  Neurologic exam is without focal deficit.  Small to moderate-sized recurrent left inguinal hernia.  Reduces easily.  Otherwise genitalia to include testicles are normally positioned  Assessment/Plan    Impression: Recurrent left inguinal hernia.  Symptoms are vague but no pain.  He has been living comfortably with the hernia with a truss.    I did recommend and offer robotic recurrent left inguinal hernia repair with mesh.  We discussed the procedure in detail.  At this time he is not terribly interested in surgery and states \"I do not want to do it \".  Feel that a watchful waiting approach would not be unreasonable given the fact he is asymptomatic.  I have asked him to follow-up with me in 4 months, or sooner should he change his mind regarding surgery.       "

## 2024-04-02 ENCOUNTER — APPOINTMENT (OUTPATIENT)
Dept: PRIMARY CARE | Facility: CLINIC | Age: 69
End: 2024-04-02
Payer: MEDICARE

## 2024-04-08 ENCOUNTER — APPOINTMENT (OUTPATIENT)
Dept: SURGERY | Facility: CLINIC | Age: 69
End: 2024-04-08
Payer: MEDICARE

## 2024-04-23 ENCOUNTER — LAB (OUTPATIENT)
Dept: LAB | Facility: LAB | Age: 69
End: 2024-04-23
Payer: MEDICARE

## 2024-04-23 ENCOUNTER — OFFICE VISIT (OUTPATIENT)
Dept: GASTROENTEROLOGY | Facility: CLINIC | Age: 69
End: 2024-04-23
Payer: MEDICARE

## 2024-04-23 VITALS — BODY MASS INDEX: 23.84 KG/M2 | HEIGHT: 76 IN | WEIGHT: 195.8 LBS

## 2024-04-23 DIAGNOSIS — R10.84 GENERALIZED ABDOMINAL PAIN: ICD-10-CM

## 2024-04-23 DIAGNOSIS — K21.9 GASTROESOPHAGEAL REFLUX DISEASE, UNSPECIFIED WHETHER ESOPHAGITIS PRESENT: ICD-10-CM

## 2024-04-23 DIAGNOSIS — R10.84 GENERALIZED ABDOMINAL PAIN: Primary | ICD-10-CM

## 2024-04-23 PROCEDURE — 36415 COLL VENOUS BLD VENIPUNCTURE: CPT

## 2024-04-23 PROCEDURE — 1159F MED LIST DOCD IN RCRD: CPT | Performed by: INTERNAL MEDICINE

## 2024-04-23 PROCEDURE — 80053 COMPREHEN METABOLIC PANEL: CPT

## 2024-04-23 PROCEDURE — 1160F RVW MEDS BY RX/DR IN RCRD: CPT | Performed by: INTERNAL MEDICINE

## 2024-04-23 PROCEDURE — 99214 OFFICE O/P EST MOD 30 MIN: CPT | Performed by: INTERNAL MEDICINE

## 2024-04-23 PROCEDURE — 85025 COMPLETE CBC W/AUTO DIFF WBC: CPT

## 2024-04-23 PROCEDURE — 1036F TOBACCO NON-USER: CPT | Performed by: INTERNAL MEDICINE

## 2024-04-23 NOTE — PROGRESS NOTES
REASON FOR VISIT: Discuss recent abdominal discomfort since colonoscopy    HPI:  Paco Gunderson is a 68 y.o. male with a past medical history of GERD and prior left inguinal hernia repair being evaluated in the office for generalized abdominal discomfort over the past few weeks since colonoscopy.  Reports discomfort with pressure sensation throughout the abdomen.  Also afraid to eat due to this discomfort.  Reports bowel movements been regular.  No rectal bleeding beyond the first day after colonoscopy.  He had 3 subcentimeter polyps resected during colonoscopy that were adenomas.  No emesis though he reports nausea symptoms.  Also reports intermittent lightheadedness that accompanies heartburn symptoms.  States omeprazole seem to worsen his symptoms.          PRIOR ENDOSCOPY    PAST MEDICAL HISTORY  Past Medical History:   Diagnosis Date    Encounter for general adult medical examination without abnormal findings 2020    Encounter for preventive health examination    Personal history of other diseases of male genital organs     History of prostate disorder    Personal history of other diseases of the musculoskeletal system and connective tissue     History of arthritis    Personal history of other specified conditions 2022    History of palpitations       PAST SURGICAL HISTORY  Past Surgical History:   Procedure Laterality Date    HERNIA REPAIR  2018    Hernia Repair    OTHER SURGICAL HISTORY  2018    Primary Repair Of Knee Ligament Cruciate Posterior       FAMILY HISTORY  No family history on file.    SOCIAL HISTORY  Social History     Tobacco Use    Smoking status: Former     Current packs/day: 0.00     Types: Cigarettes     Quit date:      Years since quittin.3    Smokeless tobacco: Never   Substance Use Topics    Alcohol use: Yes       REVIEW OF SYSTEMS  CONSTITUTIONAL: negative for fever, chills, fatigue, or unintentional weight loss,   HEENT: negative for icteric sclera, eye  "pain/redness, or changes in vision/hearing  RESPIRATORY: negative for cough, hemoptysis, wheezing, orthopnea, or dyspnea on exertion  CARDIOVASCULAR: negative for chest pain, palpitations, or syncope   GASTROINTESTINAL: as noted per HPI  GENITOURINARY: negative for dysuria, polyuria, incontinence, or hematuria  MUSCULOSKELETAL: negative for arthralgia, myalgia, or joint swelling/stiffness   INTEGUMENTARY/SKIN: negative jaundice, rash, or skin lesion  HEMATOLOGIC/LYMPHATIC: negative for prolonged bleeding, easy bruising, or swollen lymph nodes  ENDOCRINE: negative for cold/heat intolerance, polydipsia, polyuria, or goiter  NEUROLOGIC: negative for headaches, dizziness, tremor, or gait abnormality  PSYCHIATRIC: negative for anxiety, depression, personality changes, or sleep disturbances      A 10 point review of systems was completed and was otherwise negative.    ALLERGIES  No Known Allergies    MEDICATIONS  Current Outpatient Medications   Medication Sig Dispense Refill    diclofenac sodium (Voltaren) 1 % gel gel Apply 1 Application topically 4 times a day. 100 g 1    lisinopril 20 mg tablet Take 1 tablet (20 mg) by mouth once daily. 30 tablet 11    metoprolol tartrate (Lopressor) 25 mg tablet Take 1 tablet (25 mg) by mouth 2 times a day.      omeprazole (PriLOSEC) 20 mg DR capsule Take 1 capsule (20 mg) by mouth once daily in the morning. Take before meals. 30 capsule 0    tamsulosin (Flomax) 0.4 mg 24 hr capsule Take 1 capsule (0.4 mg) by mouth once daily. (Patient taking differently: Take 1 capsule (0.4 mg) by mouth 2 times a week.) 30 capsule 5     No current facility-administered medications for this visit.       VITALS  Ht 1.93 m (6' 4\")   Wt 88.8 kg (195 lb 12.8 oz)   BMI 23.83 kg/m²      PHYSICAL EXAM  Alert and oriented in no acute distress      ASSESSMENT/ PLAN  Patient with several week history of generalized abdominal discomfort and dyspepsia symptoms.  Also reports nausea.  Ongoing.  Has had increased " heartburn symptoms as well not tolerating PPI therapy.  Will pursue CT abdomen pelvis given his ongoing symptoms to rule out any internal hernia given symptoms started after colonoscopy.  Will check CBC and comprehensive metabolic panel.  Will follow-up on imaging and labs.  He is in agreement with the plan.        Signature: Uzair Mckeon MD    Date: 4/23/2024  Time: 2:49 PM

## 2024-04-24 LAB
ALBUMIN SERPL BCP-MCNC: 4.3 G/DL (ref 3.4–5)
ALP SERPL-CCNC: 59 U/L (ref 33–136)
ALT SERPL W P-5'-P-CCNC: 18 U/L (ref 10–52)
ANION GAP SERPL CALC-SCNC: 13 MMOL/L (ref 10–20)
AST SERPL W P-5'-P-CCNC: 18 U/L (ref 9–39)
BASOPHILS # BLD AUTO: 0.08 X10*3/UL (ref 0–0.1)
BASOPHILS NFR BLD AUTO: 1.2 %
BILIRUB SERPL-MCNC: 0.7 MG/DL (ref 0–1.2)
BUN SERPL-MCNC: 18 MG/DL (ref 6–23)
CALCIUM SERPL-MCNC: 9.8 MG/DL (ref 8.6–10.6)
CHLORIDE SERPL-SCNC: 104 MMOL/L (ref 98–107)
CO2 SERPL-SCNC: 26 MMOL/L (ref 21–32)
CREAT SERPL-MCNC: 1.57 MG/DL (ref 0.5–1.3)
EGFRCR SERPLBLD CKD-EPI 2021: 47 ML/MIN/1.73M*2
EOSINOPHIL # BLD AUTO: 0.18 X10*3/UL (ref 0–0.7)
EOSINOPHIL NFR BLD AUTO: 2.7 %
ERYTHROCYTE [DISTWIDTH] IN BLOOD BY AUTOMATED COUNT: 12.8 % (ref 11.5–14.5)
GLUCOSE SERPL-MCNC: 108 MG/DL (ref 74–99)
HCT VFR BLD AUTO: 46.2 % (ref 41–52)
HGB BLD-MCNC: 14.5 G/DL (ref 13.5–17.5)
IMM GRANULOCYTES # BLD AUTO: 0.02 X10*3/UL (ref 0–0.7)
IMM GRANULOCYTES NFR BLD AUTO: 0.3 % (ref 0–0.9)
LYMPHOCYTES # BLD AUTO: 1.99 X10*3/UL (ref 1.2–4.8)
LYMPHOCYTES NFR BLD AUTO: 29.6 %
MCH RBC QN AUTO: 27.6 PG (ref 26–34)
MCHC RBC AUTO-ENTMCNC: 31.4 G/DL (ref 32–36)
MCV RBC AUTO: 88 FL (ref 80–100)
MONOCYTES # BLD AUTO: 0.63 X10*3/UL (ref 0.1–1)
MONOCYTES NFR BLD AUTO: 9.4 %
NEUTROPHILS # BLD AUTO: 3.83 X10*3/UL (ref 1.2–7.7)
NEUTROPHILS NFR BLD AUTO: 56.8 %
NRBC BLD-RTO: 0 /100 WBCS (ref 0–0)
PLATELET # BLD AUTO: 264 X10*3/UL (ref 150–450)
POTASSIUM SERPL-SCNC: 4.1 MMOL/L (ref 3.5–5.3)
PROT SERPL-MCNC: 7.3 G/DL (ref 6.4–8.2)
RBC # BLD AUTO: 5.26 X10*6/UL (ref 4.5–5.9)
SODIUM SERPL-SCNC: 139 MMOL/L (ref 136–145)
WBC # BLD AUTO: 6.7 X10*3/UL (ref 4.4–11.3)

## 2024-04-29 ENCOUNTER — APPOINTMENT (OUTPATIENT)
Dept: PRIMARY CARE | Facility: CLINIC | Age: 69
End: 2024-04-29
Payer: MEDICARE

## 2024-05-03 ENCOUNTER — HOSPITAL ENCOUNTER (OUTPATIENT)
Dept: RADIOLOGY | Facility: CLINIC | Age: 69
End: 2024-05-03
Payer: MEDICARE

## 2024-05-10 DIAGNOSIS — Z95.1 POSTSURGICAL AORTOCORONARY BYPASS STATUS: Primary | ICD-10-CM

## 2024-05-13 DIAGNOSIS — Z95.5 H/O HEART ARTERY STENT: Primary | ICD-10-CM

## 2024-05-17 DIAGNOSIS — N40.1 BENIGN PROSTATIC HYPERPLASIA WITH URINARY FREQUENCY: ICD-10-CM

## 2024-05-17 DIAGNOSIS — R35.0 BENIGN PROSTATIC HYPERPLASIA WITH URINARY FREQUENCY: ICD-10-CM

## 2024-05-18 RX ORDER — TAMSULOSIN HYDROCHLORIDE 0.4 MG/1
0.4 CAPSULE ORAL DAILY
Qty: 90 CAPSULE | Refills: 0 | Status: SHIPPED | OUTPATIENT
Start: 2024-05-18

## 2024-05-21 ENCOUNTER — CLINICAL SUPPORT (OUTPATIENT)
Dept: CARDIAC REHAB | Facility: CLINIC | Age: 69
End: 2024-05-21
Payer: MEDICARE

## 2024-05-21 VITALS
DIASTOLIC BLOOD PRESSURE: 62 MMHG | HEIGHT: 76 IN | WEIGHT: 195 LBS | SYSTOLIC BLOOD PRESSURE: 102 MMHG | BODY MASS INDEX: 23.75 KG/M2 | OXYGEN SATURATION: 97 %

## 2024-05-21 DIAGNOSIS — Z95.5 H/O HEART ARTERY STENT: ICD-10-CM

## 2024-05-21 DIAGNOSIS — Z95.5 H/O HEART ARTERY STENT: Primary | ICD-10-CM

## 2024-05-21 RX ORDER — CLOPIDOGREL BISULFATE 75 MG/1
75 TABLET ORAL DAILY
COMMUNITY

## 2024-05-21 RX ORDER — ASPIRIN 81 MG/1
81 TABLET ORAL DAILY
COMMUNITY

## 2024-05-21 RX ORDER — ATORVASTATIN CALCIUM 40 MG/1
40 TABLET, FILM COATED ORAL DAILY
COMMUNITY

## 2024-05-21 ASSESSMENT — PATIENT HEALTH QUESTIONNAIRE - PHQ9
9. THOUGHTS THAT YOU WOULD BE BETTER OFF DEAD, OR OF HURTING YOURSELF: NOT AT ALL
7. TROUBLE CONCENTRATING ON THINGS, SUCH AS READING THE NEWSPAPER OR WATCHING TELEVISION: NOT AT ALL
6. FEELING BAD ABOUT YOURSELF - OR THAT YOU ARE A FAILURE OR HAVE LET YOURSELF OR YOUR FAMILY DOWN: NOT AT ALL
3. TROUBLE FALLING OR STAYING ASLEEP OR SLEEPING TOO MUCH: NOT AT ALL
SUM OF ALL RESPONSES TO PHQ QUESTIONS 1-9: 1
2. FEELING DOWN, DEPRESSED OR HOPELESS: NOT AT ALL
SUM OF ALL RESPONSES TO PHQ QUESTIONS 1-9: 1
1. LITTLE INTEREST OR PLEASURE IN DOING THINGS: NOT AT ALL
8. MOVING OR SPEAKING SO SLOWLY THAT OTHER PEOPLE COULD HAVE NOTICED. OR THE OPPOSITE, BEING SO FIGETY OR RESTLESS THAT YOU HAVE BEEN MOVING AROUND A LOT MORE THAN USUAL: NOT AT ALL
4. FEELING TIRED OR HAVING LITTLE ENERGY: SEVERAL DAYS
5. POOR APPETITE OR OVEREATING: NOT AT ALL
SUM OF ALL RESPONSES TO PHQ9 QUESTIONS 1 & 2: 0

## 2024-05-21 ASSESSMENT — DUKE ACTIVITY SCORE INDEX (DASI)
CAN YOU CLIMB A FLIGHT OF STAIRS OR WALK UP A HILL: YES
CAN YOU DO HEAVY WORK AROUND THE HOUSE LIKE SCRUBBING FLOORS OR LIFTING AND MOVING HEAVY FURNITURE: YES
CAN YOU PARTICIPATE IN STRENOUS SPORTS LIKE SWIMMING, SINGLES TENNIS, FOOTBALL, BASKETBALL, OR SKIING: NO
CAN YOU DO LIGHT WORK AROUND THE HOUSE LIKE DUSTING OR WASHING DISHES: YES
CAN YOU DO YARD WORK LIKE RAKING LEAVES, WEEDING OR PUSHING A MOWER: YES
CAN YOU TAKE CARE OF YOURSELF (EAT, DRESS, BATHE, OR USE TOILET): YES
DASI METS SCORE: 7.3
CAN YOU DO MODERATE WORK AROUND THE HOUSE LIKE VACUUMING, SWEEPING FLOORS OR CARRYING GROCERIES: YES
CAN YOU PARTICIPATE IN MODERATE RECREATIONAL ACTIVITIES LIKE GOLF, BOWLING, DANCING, DOUBLES TENNIS OR THROWING A BASEBALL OR FOOTBALL: NO
CAN YOU RUN A SHORT DISTANCE: NO
CAN YOU HAVE SEXUAL RELATIONS: YES
CAN YOU WALK INDOORS, SUCH AS AROUND YOUR HOUSE: YES
TOTAL_SCORE: 36.7
CAN YOU WALK A BLOCK OR TWO ON LEVEL GROUND: YES

## 2024-05-21 NOTE — PROGRESS NOTES
"  Cardiac Rehabilitation Initial Treatment Plan    Name: Paco Gunderson  Medical Record Number: [id number]  YOB: 1955  Age: 68 y.o.    Today’s Date: 5/21/2024  Primary Care Physician: Curt Calvo MD  Referring Physician: Curt Calvo MD  Program Location: [address]  Primary Diagnosis:   1. H/O heart artery stent  Referral to Cardiac Rehab         Onset/Date of Diagnosis: 4/29/24    Initial Assessment, not yet started program.    AACVPR Risk Stratification: High     Falls Risk: Low  Psychosocial Assessment     Pre PHQ-9: 1      Sent PH-Q 9 to MD if score > 20: No; score < 20    Pt reported/currently experiencing stress: No  Patient uses stress management skills: No   History of: no history of anxiety or depression  Currently seeing a mental health provider: No  Social Support: Yes, Whom:Saida, spouse and family  Quality of Life Survey: SF-36   SF-36 Pre Post   Physical Component Score  TBD   Mental Component Score  TBD     Learning Assessment:  Learning assessment/barriers: None  Preferred learning method: Reading handout  Barriers: None  Comments:    Stages of Change:Preparation    Psychosocial Plan    Goal Status: Initial Assessment; goals not yet started    Psychosocial Goals: Maintain or lower PH-Q 9 score by discharge    Psychosocial Interventions/Education: To be done in Cardiac Rehab.    Initial Assessment:      Nutrition Assessment:    Hyperlipidemia: Yes     Lipids:   Lab Results   Component Value Date    CHOL 194 04/13/2022    HDL 47.8 04/13/2022       Current Dietary Guidelines: Low fat, Low sodium  Barriers to dietary change: no    Diet Habit Survey: Picture Your Plate  Pre: Initial survey given. Pending completion and return from patient.  Post: To be done at discharge.    Diabetes Assessment      History of Diabetes: No    Weight Management    Height: 193 cm (6' 4\")  Weight: 88.5 kg (195 lb)  BMI (Calculated): 23.75      Nutrition Plan    Goal Status: Initial " Assessment; goals not yet started    Nutrition Goals: Improve Diet Habit Survey score by 5-10 points by discharge, Adapt a low-sodium, DASH diet prior to discharge, and Learn how to read and interpret nutrition labels prior to discharge    Nutrition Interventions/Education:   To be done in Cardiac Rehab.    Initial Assessment:      Exercise Assessment    No  Mode: NA  Frequency: NA  Duration: NA    Exercise Prescription     Exercise Prescription based on: Duke Activity Status Index (DASI)    DASI Score: 36.7   MET Score: 7.3   Frequency:  3 days/week   Mode: Treadmill, NuStep, and Recumbent Cycle   Duration: 24 total aerobic minutes   Intensity: RPE 12-14  Target HR:      MET Level: 3.7  Patient wears supplemental O2: No     Modality Workload METs Duration (minutes)   1 Pre-Exercise   2   2 Treadmill 2.2 mph @ 2%  3.3 8 :00   3 NuStep Load 4 @88 [place]  3.6 8 :00   4 Recumbent Bike Load 5 @ 60 rpm 3.6 8 :00   6 Post-Exercise   2     Resistance Training: No   Home Exercise Prescription given: To be given prior to discharge from program.    Exercise Plan    Goal Status: Initial Assessment; goals not yet started    Exercise Goals: Increase exercise MET level by 5-10% each week, Increase total exercise duration to 30-45 minutes, Initiate flexibility training 2-3 days a week, and Establish a home exercise program before discharge    Exercise Interventions/Education:   To be done in Cardiac Rehab.    Initial Assessment:      Other Core Components/Risk Factor Assessment:    Medication adherence  Current Medications:   Medication Documentation Review Audit       Reviewed by Sola Sanchez RN (Registered Nurse) on 05/21/24 at 1407      Medication Order Taking? Sig Documenting Provider Last Dose Status   aspirin 81 mg EC tablet [id number] Yes Take 1 tablet (81 mg) by mouth once daily. Historical Provider, MD Taking Active   atorvastatin (Lipitor) 40 mg tablet [id number] Yes Take 1 tablet (40 mg) by mouth once daily.  Historical Provider, MD Taking Active   clopidogrel (Plavix) 75 mg tablet [id number] Yes Take 1 tablet (75 mg) by mouth once daily. Historical Provider, MD Taking Active   diclofenac sodium (Voltaren) 1 % gel gel [id number] No Apply 1 Application topically 4 times a day.   Patient not taking: Reported on 2024    Curt Calvo MD Not Taking Active   lisinopril 20 mg tablet [id number] No Take 1 tablet (20 mg) by mouth once daily.   Patient not taking: Reported on 2024    Rudy Payne MD Not Taking Active   metoprolol tartrate (Lopressor) 50 mg tablet [id number] Yes Take 1 tablet (50 mg) by mouth 2 times a day. Historical Provider, MD Taking Active   omeprazole (PriLOSEC) 20 mg DR capsule [id number] No Take 1 capsule (20 mg) by mouth once daily in the morning. Take before meals.   Patient not taking: Reported on 2024    Curt Calvo MD Not Taking  10/11/23 7789    Patient taking differently:   Discontinued 24 0830   tamsulosin (Flomax) 0.4 mg 24 hr capsule [id number] Yes TAKE 1 CAPSULE BY MOUTH ONCE DAILY. Curt Calvo MD Taking Active                                 Medication compliance: Yes   Uses pill box/organizer: No    Carries medication list: No     Blood Pressure Management  History of Hypertension: Yes   Medication Changes: No   Resting BP:  Visit Vitals  /62        Heart Failure Management  Hx of Heart Failure: No    Smoking/Tobacco Assessment  Social History     Tobacco Use   Smoking Status Former    Current packs/day: 0.00    Types: Cigarettes    Quit date:     Years since quittin.4   Smokeless Tobacco Never       Other Core Component Plan    Goal Status: Initial Assessment; goals not yet started    Other Core Component Goals: Medication compliance and Achieve resting BP of < 130/80 by discharge    Other Core Component Interventions/Education:   Medication compliance    Initial Assessment:      Individual Patient Goals:    Back to golfing by session 12  Back to  gym by session 16    Goal Status: Initial Assessment; goals not yet started    Staff Comments:      Rehab Staff Signature: Sola Sanchez RN

## 2024-05-22 ENCOUNTER — OFFICE VISIT (OUTPATIENT)
Dept: ORTHOPEDIC SURGERY | Facility: CLINIC | Age: 69
End: 2024-05-22
Payer: MEDICARE

## 2024-05-22 DIAGNOSIS — M17.0 ARTHRITIS OF BOTH KNEES: ICD-10-CM

## 2024-05-22 PROCEDURE — 99204 OFFICE O/P NEW MOD 45 MIN: CPT | Performed by: ORTHOPAEDIC SURGERY

## 2024-05-22 PROCEDURE — 1036F TOBACCO NON-USER: CPT | Performed by: ORTHOPAEDIC SURGERY

## 2024-05-22 PROCEDURE — 1159F MED LIST DOCD IN RCRD: CPT | Performed by: ORTHOPAEDIC SURGERY

## 2024-05-22 PROCEDURE — 1160F RVW MEDS BY RX/DR IN RCRD: CPT | Performed by: ORTHOPAEDIC SURGERY

## 2024-05-22 NOTE — PROGRESS NOTES
This is a consultation from Dr. Curt Calvo MD for No chief complaint on file.      This is a 68 y.o. male who presents for bilateral knee pain.  Patient states has had bilateral knee pain for about 20 years, this is a chronic issue but is been recent exacerbated.  Planes of sharp pain over the medial and lateral knee on both sides worse with walking.  He has difficulty walking more than a few blocks.  He also has stiffness and instability of the knees.  No numbness or tingling no fevers no chills no shooting pain down the leg.  He has had injections in the past but is been several years.  Never had surgery on either knee    Physical Exam    There has been no interval change in this patient's past medical, surgical, medications, allergies, family history or social history since the most recent visit to a provider within our department. 14 point review of systems was performed, reviewed, and negative except for pertinent positives documented in the history of present illness.     Constitutional: well developed, well nourished male in no acute distress  Psychiatric: normal mood, appropriate affect  Eyes: sclera anicteric  HENT: normocephalic/atraumatic  CV: regular rate and rhythm   Respiratory: non labored breathing  Integumentary: no rash  Neurological: moves all extremities    Bilateral knee exam: skin intact no lacerations or abrations. no effusion.  Tender medial joint line. negative log roll negative patellar grind. ROM 0-120. stable to varus and valgus stress at 0 and 30 degrees. negative lachman negative posterior drawer negative linda. 5/5 ehl/fhl/gs/ta. silt s/s/sp/dp/t. 2+ dp/pt        Xrays were ordered by me, they were reviewed and independently interpreted by me today, they show severe degenerative disease bilaterally bone-on-bone arthritis severe bilateral knee arthritis.    Procedures      Impression/Plan: This is a 68 y.o. male with severe bilateral knee arthritis.  I had an in depth discussion  "with the patient regarding treatment options for arthritis and their relative risks and benefits. We reviewed surgical and nonsurgical option for treatment. Treatments include anti inflammatory medications, physical therapy, weight loss, activity modification, use of assistive devices, injection therapies. We discussed current prescriptions and risks and benefits of continuation of prescription medication as apporpriate. We discussed that arthritis is often progressive over time, an in end stage arthritis surgical interventions can be considered, including arthroplasty. All questions were answered and the patient voiced their understanding.  We discussed injections, he deferred for today.  I will see him back as needed    BMI Readings from Last 1 Encounters:   05/21/24 23.74 kg/m²      Lab Results   Component Value Date    CREATININE 1.57 (H) 04/23/2024     Tobacco Use: Medium Risk (5/22/2024)    Patient History     Smoking Tobacco Use: Former     Smokeless Tobacco Use: Never     Passive Exposure: Not on file      Computed MELD 3.0 unavailable. One or more values for this score either were not found within the given timeframe or did not fit some other criterion.  Computed MELD-Na unavailable. One or more values for this score either were not found within the given timeframe or did not fit some other criterion.       No results found for: \"HGBA1C\"  No results found for: \"STAPHMRSASCR\"  "

## 2024-05-31 ENCOUNTER — CLINICAL SUPPORT (OUTPATIENT)
Dept: CARDIAC REHAB | Facility: CLINIC | Age: 69
End: 2024-05-31
Payer: MEDICARE

## 2024-05-31 DIAGNOSIS — Z95.5 H/O HEART ARTERY STENT: ICD-10-CM

## 2024-05-31 PROCEDURE — 93798 PHYS/QHP OP CAR RHAB W/ECG: CPT | Performed by: INTERNAL MEDICINE

## 2024-06-03 ENCOUNTER — CLINICAL SUPPORT (OUTPATIENT)
Dept: CARDIAC REHAB | Facility: CLINIC | Age: 69
End: 2024-06-03
Payer: MEDICARE

## 2024-06-03 DIAGNOSIS — Z95.5 H/O HEART ARTERY STENT: ICD-10-CM

## 2024-06-03 PROCEDURE — 93798 PHYS/QHP OP CAR RHAB W/ECG: CPT

## 2024-06-04 ENCOUNTER — CLINICAL SUPPORT (OUTPATIENT)
Dept: CARDIAC REHAB | Facility: CLINIC | Age: 69
End: 2024-06-04
Payer: MEDICARE

## 2024-06-04 DIAGNOSIS — Z95.5 H/O HEART ARTERY STENT: ICD-10-CM

## 2024-06-04 PROCEDURE — 93798 PHYS/QHP OP CAR RHAB W/ECG: CPT

## 2024-06-06 ENCOUNTER — APPOINTMENT (OUTPATIENT)
Dept: ORTHOPEDIC SURGERY | Facility: CLINIC | Age: 69
End: 2024-06-06
Payer: MEDICARE

## 2024-06-06 ENCOUNTER — OFFICE VISIT (OUTPATIENT)
Dept: PRIMARY CARE | Facility: CLINIC | Age: 69
End: 2024-06-06
Payer: MEDICARE

## 2024-06-06 VITALS
WEIGHT: 197 LBS | HEART RATE: 71 BPM | SYSTOLIC BLOOD PRESSURE: 120 MMHG | DIASTOLIC BLOOD PRESSURE: 66 MMHG | OXYGEN SATURATION: 96 % | BODY MASS INDEX: 23.99 KG/M2 | HEIGHT: 76 IN

## 2024-06-06 DIAGNOSIS — G47.00 INSOMNIA, UNSPECIFIED TYPE: ICD-10-CM

## 2024-06-06 DIAGNOSIS — I49.1 PAC (PREMATURE ATRIAL CONTRACTION): ICD-10-CM

## 2024-06-06 DIAGNOSIS — R53.82 CHRONIC FATIGUE: ICD-10-CM

## 2024-06-06 DIAGNOSIS — R06.83 SNORING: ICD-10-CM

## 2024-06-06 DIAGNOSIS — I25.10 CORONARY ARTERY DISEASE INVOLVING NATIVE CORONARY ARTERY OF NATIVE HEART WITHOUT ANGINA PECTORIS: Primary | ICD-10-CM

## 2024-06-06 PROCEDURE — 3078F DIAST BP <80 MM HG: CPT | Performed by: INTERNAL MEDICINE

## 2024-06-06 PROCEDURE — 1159F MED LIST DOCD IN RCRD: CPT | Performed by: INTERNAL MEDICINE

## 2024-06-06 PROCEDURE — 3074F SYST BP LT 130 MM HG: CPT | Performed by: INTERNAL MEDICINE

## 2024-06-06 PROCEDURE — 99214 OFFICE O/P EST MOD 30 MIN: CPT | Performed by: INTERNAL MEDICINE

## 2024-06-06 RX ORDER — TRAZODONE HYDROCHLORIDE 50 MG/1
50 TABLET ORAL NIGHTLY PRN
Qty: 30 TABLET | Refills: 3 | Status: SHIPPED | OUTPATIENT
Start: 2024-06-06 | End: 2025-06-06

## 2024-06-06 NOTE — PROGRESS NOTES
"Subjective   Patient ID: Paco Gunderson is a 68 y.o. male who presents for Request For Order(s) (Sleep study ).    HPI patient presents to clinic after his discharge from [facility].  He was admitted there for increasing shortness of breath and dizziness and was found to have episode of NSVT and had cardiac catheterization which revealed occlusion of proximal and mid LAD and underwent angioplasty and drug-eluting stenting on 4/29/2024 without any complication.  He also underwent colonoscopy on 3/12/2024 and revealed multiple polyps were removed from descending and ascending colon and biopsy came back revealing tubular adenoma.  He has been complaining of snoring, insomnia and some shortness of breath for the past few months.  He is concerned about sleep apnea    Review of Systems   Constitutional: Negative.    HENT: Negative.     Eyes: Negative.    Respiratory:  Positive for shortness of breath.    Cardiovascular: Negative.    Gastrointestinal: Negative.    Endocrine: Negative.    Genitourinary: Negative.    Musculoskeletal: Negative.    Skin: Negative.    Allergic/Immunologic: Negative.    Neurological: Negative.    Hematological: Negative.    Psychiatric/Behavioral: Negative.         Objective   /66   Pulse 71   Ht 1.93 m (6' 4\")   Wt 89.4 kg (197 lb)   SpO2 96%   BMI 23.98 kg/m²     Physical Exam  Constitutional:       Appearance: Normal appearance. He is normal weight.   HENT:      Right Ear: Tympanic membrane normal.      Left Ear: Tympanic membrane and ear canal normal.      Nose: Nose normal.   Neck:      Vascular: No carotid bruit.   Cardiovascular:      Rate and Rhythm: Normal rate.   Pulmonary:      Effort: No respiratory distress.      Breath sounds: No stridor. No wheezing.   Abdominal:      Palpations: Abdomen is soft.      Tenderness: There is no abdominal tenderness. There is no guarding or rebound.   Skin:     Coloration: Skin is not jaundiced.      Findings: No bruising. "   Neurological:      General: No focal deficit present.      Mental Status: He is alert and oriented to person, place, and time.   Psychiatric:         Mood and Affect: Mood normal.         Assessment/Plan    patient is given trial with trazodone regarding insomnia.  He will be scheduled for sleep study regarding his symptoms of snoring and fatigability.  He will continue to follow-up with cardiology clinic regarding recent diagnosis of coronary artery disease and stenting.  He will continue metoprolol, Plavix, aspirin and atorvastatin regarding history of coronary artery disease.  He will return to clinic in 3 months for follow-up study.

## 2024-06-07 ENCOUNTER — CLINICAL SUPPORT (OUTPATIENT)
Dept: CARDIAC REHAB | Facility: CLINIC | Age: 69
End: 2024-06-07
Payer: MEDICARE

## 2024-06-07 DIAGNOSIS — Z95.5 H/O HEART ARTERY STENT: ICD-10-CM

## 2024-06-07 PROCEDURE — 93798 PHYS/QHP OP CAR RHAB W/ECG: CPT | Performed by: INTERNAL MEDICINE

## 2024-06-07 NOTE — PROGRESS NOTES
Paco Gunderson  1955  [id number]  68 y.o.    [facility] [id number]ARD[facility]      Cardiac/Pulmonary Rehab Nutrition Education    6/7/2024  Gave and reviewed Label Reading Highlights with pt. Pt had questions regarding diet and eating Mediterranean. Goes out to eat several times a week and doesn't really know how to cook. Has been eating at [employer]. Discussed how dining out increases sodium intake in foods. Encouraged pt to try cooking some meals to help reduce sodium intake and have better management of sodium intake. Encouraged to follow up with questions as needed.    Signature Tracey Finn RDN, LD

## 2024-06-08 ASSESSMENT — ENCOUNTER SYMPTOMS
CONSTITUTIONAL NEGATIVE: 1
GASTROINTESTINAL NEGATIVE: 1
SHORTNESS OF BREATH: 1
CARDIOVASCULAR NEGATIVE: 1
MUSCULOSKELETAL NEGATIVE: 1
NEUROLOGICAL NEGATIVE: 1
ALLERGIC/IMMUNOLOGIC NEGATIVE: 1
EYES NEGATIVE: 1
ENDOCRINE NEGATIVE: 1
HEMATOLOGIC/LYMPHATIC NEGATIVE: 1
PSYCHIATRIC NEGATIVE: 1

## 2024-06-10 ENCOUNTER — CLINICAL SUPPORT (OUTPATIENT)
Dept: CARDIAC REHAB | Facility: CLINIC | Age: 69
End: 2024-06-10
Payer: MEDICARE

## 2024-06-10 DIAGNOSIS — Z95.5 H/O HEART ARTERY STENT: ICD-10-CM

## 2024-06-10 PROCEDURE — 93798 PHYS/QHP OP CAR RHAB W/ECG: CPT | Performed by: INTERNAL MEDICINE

## 2024-06-11 ENCOUNTER — CLINICAL SUPPORT (OUTPATIENT)
Dept: CARDIAC REHAB | Facility: CLINIC | Age: 69
End: 2024-06-11
Payer: MEDICARE

## 2024-06-11 DIAGNOSIS — Z95.5 H/O HEART ARTERY STENT: ICD-10-CM

## 2024-06-11 PROCEDURE — 93798 PHYS/QHP OP CAR RHAB W/ECG: CPT | Performed by: INTERNAL MEDICINE

## 2024-06-14 ENCOUNTER — CLINICAL SUPPORT (OUTPATIENT)
Dept: CARDIAC REHAB | Facility: CLINIC | Age: 69
End: 2024-06-14
Payer: MEDICARE

## 2024-06-14 DIAGNOSIS — Z95.5 H/O HEART ARTERY STENT: ICD-10-CM

## 2024-06-14 PROCEDURE — 93798 PHYS/QHP OP CAR RHAB W/ECG: CPT | Performed by: INTERNAL MEDICINE

## 2024-06-17 ENCOUNTER — APPOINTMENT (OUTPATIENT)
Dept: CARDIAC REHAB | Facility: CLINIC | Age: 69
End: 2024-06-17
Payer: MEDICARE

## 2024-06-18 ENCOUNTER — APPOINTMENT (OUTPATIENT)
Dept: CARDIAC REHAB | Facility: CLINIC | Age: 69
End: 2024-06-18
Payer: MEDICARE

## 2024-06-18 ENCOUNTER — DOCUMENTATION (OUTPATIENT)
Dept: CARDIAC REHAB | Facility: CLINIC | Age: 69
End: 2024-06-18

## 2024-06-18 ENCOUNTER — OFFICE VISIT (OUTPATIENT)
Dept: PRIMARY CARE | Facility: CLINIC | Age: 69
End: 2024-06-18
Payer: MEDICARE

## 2024-06-18 VITALS
OXYGEN SATURATION: 96 % | HEIGHT: 76 IN | SYSTOLIC BLOOD PRESSURE: 101 MMHG | BODY MASS INDEX: 23.99 KG/M2 | DIASTOLIC BLOOD PRESSURE: 67 MMHG | WEIGHT: 197 LBS | HEART RATE: 65 BPM

## 2024-06-18 DIAGNOSIS — M54.50 ACUTE BILATERAL LOW BACK PAIN WITHOUT SCIATICA: Primary | ICD-10-CM

## 2024-06-18 PROCEDURE — 99213 OFFICE O/P EST LOW 20 MIN: CPT | Performed by: INTERNAL MEDICINE

## 2024-06-18 PROCEDURE — 1125F AMNT PAIN NOTED PAIN PRSNT: CPT | Performed by: INTERNAL MEDICINE

## 2024-06-18 PROCEDURE — 3078F DIAST BP <80 MM HG: CPT | Performed by: INTERNAL MEDICINE

## 2024-06-18 PROCEDURE — 1159F MED LIST DOCD IN RCRD: CPT | Performed by: INTERNAL MEDICINE

## 2024-06-18 PROCEDURE — 3074F SYST BP LT 130 MM HG: CPT | Performed by: INTERNAL MEDICINE

## 2024-06-18 RX ORDER — CYCLOBENZAPRINE HCL 5 MG
5 TABLET ORAL NIGHTLY PRN
Qty: 10 TABLET | Refills: 0 | Status: SHIPPED | OUTPATIENT
Start: 2024-06-18 | End: 2024-06-28

## 2024-06-18 RX ORDER — PREDNISONE 10 MG/1
10 TABLET ORAL DAILY
Qty: 7 TABLET | Refills: 0 | Status: SHIPPED | OUTPATIENT
Start: 2024-06-18 | End: 2024-06-25

## 2024-06-18 ASSESSMENT — PATIENT HEALTH QUESTIONNAIRE - PHQ9
1. LITTLE INTEREST OR PLEASURE IN DOING THINGS: NOT AT ALL
2. FEELING DOWN, DEPRESSED OR HOPELESS: NOT AT ALL
SUM OF ALL RESPONSES TO PHQ9 QUESTIONS 1 AND 2: 0

## 2024-06-18 ASSESSMENT — PAIN SCALES - GENERAL: PAINLEVEL: 2

## 2024-06-18 ASSESSMENT — COLUMBIA-SUICIDE SEVERITY RATING SCALE - C-SSRS: 1. IN THE PAST MONTH, HAVE YOU WISHED YOU WERE DEAD OR WISHED YOU COULD GO TO SLEEP AND NOT WAKE UP?: NO

## 2024-06-18 NOTE — PROGRESS NOTES
"  Cardiac Rehabilitation 30 Day Reassessment    Name: Paco Gunderson  Medical Record Number: [id number]  YOB: 1955  Age: 68 y.o.    Today’s Date: 6/18/2024  Primary Care Physician: Curt Calvo MD  Referring Physician: Curt Calvo MD  Program Location: [address]  Primary Diagnosis:   1. H/O heart artery stent  Referral to Cardiac Rehab         Onset/Date of Diagnosis: 4/29/24  Session # 7  AACVPR Risk Stratification: High   Falls Risk: Low  Psychosocial Assessment   Pre PHQ-9: 1  Sent PH-Q 9 to MD if score > 20: No; score < 20  Pt reported/currently experiencing stress: No  Patient uses stress management skills: No   History of: no history of anxiety or depression  Currently seeing a mental health provider: No  Social Support: Yes, Whom:Saida, spouse and family  Quality of Life Survey: SF-36   SF-36 Pre Post   Physical Component Score  TBD   Mental Component Score  TBD     Learning Assessment:  Learning assessment/barriers: None  Preferred learning method: Reading handout  Barriers: None  Comments:    Stages of Change:Action    Psychosocial Plan  Goal Status: In progress  Psychosocial Goals: Maintain or lower PH-Q 9 score by discharge  Psychosocial Interventions/Education:   5/21/24 PHQ9 score reviewed on initial assessment/mk  6/3/24 Attended mini stress lecture. Handout given for home review/mk    Nutrition Assessment:    Hyperlipidemia: Yes   Lipids:   Lab Results   Component Value Date    CHOL 194 04/13/2022    HDL 47.8 04/13/2022       Current Dietary Guidelines: Low fat, Low sodium  Barriers to dietary change: no  Diet Habit Survey: Picture Your Plate  Pre: Initial survey given. Pending completion and return from patient.  Post: To be done at discharge.  Diabetes Assessment  History of Diabetes: No  Weight Management  Height: 193 cm (6' 4\")  Weight: 88.5 kg (195 lb)  BMI (Calculated): 23.75      Nutrition Plan  Goal Status: In progress  Nutrition Goals: Improve Diet " Habit Survey score by 5-10 points by discharge, Adapt a low-sodium, DASH diet prior to discharge, and Learn how to read and interpret nutrition labels prior to discharge  Nutrition Interventions/Education:   6/7/2024  Gave and reviewed Label Reading Highlights with pt. Pt had questions regarding diet and eating Mediterranean. Goes out to eat several times a week and doesn't really know how to cook. Has been eating at [employer]. Discussed how dining out increases sodium intake in foods. Encouraged pt to try cooking some meals to help reduce sodium intake and have better management of sodium intake. Encouraged to follow up with questions as needed. Eun Finn RDN, COURTNEY  6/11/24 Attended mini lecture on sodium. Handout given for review/mk      Exercise Assessment  No  Mode: NA  Frequency: NA  Duration: NA    Exercise Prescription     Exercise Prescription based on: current ex rx    DASI Score:     MET Score:     Frequency:  3 days/week   Mode: Treadmill, NuStep, and Recumbent Cycle   Duration: 24 total aerobic minutes   Intensity: RPE 12-14  Target HR:     MET Level: 3.7  Patient wears supplemental O2: No     Modality Workload METs Duration (minutes)   1 Pre-Exercise   2   2 Treadmill 2.2 mph @ 2%  3.3 9 :00   3 NuStep Load 5 @90watts  3.3 9 :00   4 Recumbent Bike Load 6 @ 60 rpm 3.8 9 :00   6 Post-Exercise   2     Resistance Training: No   Home Exercise Prescription given: To be given prior to discharge from program.    Exercise Plan  Goal Status: In progress  Exercise Goals: Increase exercise MET level by 5-10% each week, Increase total exercise duration to 30-45 minutes, Initiate flexibility training 2-3 days a week, and Establish a home exercise program before discharge  Exercise Interventions/Education:   6/11/24 Adjusted THR/mk        Other Core Components/Risk Factor Assessment:    Medication adherence  Current Medications:   Medication Documentation Review Audit       Reviewed by  Jessica Riley MA (Medical Assistant) on 24 at 1005      Medication Order Taking? Sig Documenting Provider Last Dose Status   aspirin 81 mg EC tablet [id number] Yes Take 1 tablet (81 mg) by mouth once daily. Historical Provider, MD Taking Active   atorvastatin (Lipitor) 40 mg tablet [id number] Yes Take 1 tablet (40 mg) by mouth once daily. Historical Provider, MD Taking Active   clopidogrel (Plavix) 75 mg tablet [id number] Yes Take 1 tablet (75 mg) by mouth once daily. Historical Provider, MD Taking Active   diclofenac sodium (Voltaren) 1 % gel gel [id number]  Apply 1 Application topically 4 times a day. Curt Calvo MD  Flag for Review   lisinopril 20 mg tablet [id number]  Take 1 tablet (20 mg) by mouth once daily. Rudy Payne MD  Flag for Review   metoprolol tartrate (Lopressor) 50 mg tablet [id number] Yes Take 1 tablet by mouth 2 times a day. Historical Provider, MD Taking Active   omeprazole (PriLOSEC) 20 mg DR capsule [id number]  Take 1 capsule (20 mg) by mouth once daily in the morning. Take before meals. Curt Calvo MD  Flag for Review   tamsulosin (Flomax) 0.4 mg 24 hr capsule [id number] Yes TAKE 1 CAPSULE BY MOUTH ONCE DAILY. Curt Calvo MD Taking Active                                 Medication compliance: Yes   Uses pill box/organizer: No    Carries medication list: No     Blood Pressure Management  History of Hypertension: Yes   Medication Changes: No   Resting BP:  Visit Vitals  /68        Heart Failure Management  Hx of Heart Failure: No    Smoking/Tobacco Assessment  Social History     Tobacco Use   Smoking Status Former    Current packs/day: 0.00    Types: Cigarettes    Quit date:     Years since quittin.4   Smokeless Tobacco Never       Other Core Component Plan    Goal Status: Initial Assessment; goals not yet started  Other Core Component Goals: Medication compliance and Achieve resting BP of < 130/80 by discharge  Other Core Component Interventions/Education:   24  "Resting blood pressures WNL/mk        Individual Patient Goals:    Back to golfing by session 12  Back to gym by session 16    Goal Status: In progress    Staff Comments:  Mr Gunderson has attended 7 sessions since starting rehab. He recently has been absent due to \"back pain\". Has an appt for eval. No ST depression or ectopy noted on telemetry monitor.     Rehab Staff Signature: Sola Sanchez RN        "

## 2024-06-18 NOTE — PROGRESS NOTES
"Subjective   Patient ID: Paco Gunderson is a 68 y.o. male who presents for No chief complaint on file..    HPI patient presents to clinic as problem visit because of low back pains for the past 4 days.  He developed this pain as he was trying to move the table in the bedroom.  He denies any radiation of pain to lower extremity, bladder bowel incontinence, weakness and paresthesia.    Review of Systems   Constitutional: Negative.    HENT: Negative.     Eyes: Negative.    Respiratory: Negative.     Cardiovascular: Negative.    Gastrointestinal: Negative.    Endocrine: Negative.    Genitourinary: Negative.    Musculoskeletal:  Positive for arthralgias and back pain.   Skin: Negative.    Allergic/Immunologic: Negative.    Neurological: Negative.    Hematological: Negative.    Psychiatric/Behavioral: Negative.         Objective   /67   Pulse 65   Ht 1.93 m (6' 4\")   Wt 89.4 kg (197 lb)   SpO2 96%   BMI 23.98 kg/m²     Physical Exam  Constitutional:       Appearance: Normal appearance. He is normal weight.   HENT:      Right Ear: Tympanic membrane normal.      Left Ear: Tympanic membrane and ear canal normal.      Nose: Nose normal.   Neck:      Vascular: No carotid bruit.   Cardiovascular:      Rate and Rhythm: Normal rate.   Pulmonary:      Effort: No respiratory distress.      Breath sounds: No stridor. No wheezing.   Abdominal:      Palpations: Abdomen is soft.      Tenderness: There is no abdominal tenderness. There is no guarding or rebound.   Skin:     Coloration: Skin is not jaundiced.      Findings: No bruising.   Neurological:      General: No focal deficit present.      Mental Status: He is alert and oriented to person, place, and time.   Psychiatric:         Mood and Affect: Mood normal.         Assessment/Plan    patient is advised not to do any heavy lifting or excessive bending for 1 week in view of recent episode of low back pain.  He is given trial with steroid and cyclobenzaprine regarding " back pain.  He will notify the clinic for any worsening of his symptoms.

## 2024-06-21 ENCOUNTER — APPOINTMENT (OUTPATIENT)
Dept: CARDIAC REHAB | Facility: CLINIC | Age: 69
End: 2024-06-21
Payer: MEDICARE

## 2024-06-22 ASSESSMENT — ENCOUNTER SYMPTOMS
NEUROLOGICAL NEGATIVE: 1
HEMATOLOGIC/LYMPHATIC NEGATIVE: 1
CONSTITUTIONAL NEGATIVE: 1
ARTHRALGIAS: 1
GASTROINTESTINAL NEGATIVE: 1
PSYCHIATRIC NEGATIVE: 1
RESPIRATORY NEGATIVE: 1
CARDIOVASCULAR NEGATIVE: 1
BACK PAIN: 1
ENDOCRINE NEGATIVE: 1
ALLERGIC/IMMUNOLOGIC NEGATIVE: 1
EYES NEGATIVE: 1

## 2024-06-24 ENCOUNTER — CLINICAL SUPPORT (OUTPATIENT)
Dept: CARDIAC REHAB | Facility: CLINIC | Age: 69
End: 2024-06-24
Payer: MEDICARE

## 2024-06-24 DIAGNOSIS — Z95.5 H/O HEART ARTERY STENT: ICD-10-CM

## 2024-06-24 PROCEDURE — 93798 PHYS/QHP OP CAR RHAB W/ECG: CPT | Performed by: INTERNAL MEDICINE

## 2024-06-25 ENCOUNTER — CLINICAL SUPPORT (OUTPATIENT)
Dept: CARDIAC REHAB | Facility: CLINIC | Age: 69
End: 2024-06-25
Payer: MEDICARE

## 2024-06-25 DIAGNOSIS — Z95.5 H/O HEART ARTERY STENT: ICD-10-CM

## 2024-06-25 PROCEDURE — 93798 PHYS/QHP OP CAR RHAB W/ECG: CPT | Performed by: INTERNAL MEDICINE

## 2024-06-28 ENCOUNTER — APPOINTMENT (OUTPATIENT)
Dept: CARDIAC REHAB | Facility: CLINIC | Age: 69
End: 2024-06-28
Payer: MEDICARE

## 2024-07-01 ENCOUNTER — CLINICAL SUPPORT (OUTPATIENT)
Dept: CARDIAC REHAB | Facility: CLINIC | Age: 69
End: 2024-07-01
Payer: MEDICARE

## 2024-07-01 DIAGNOSIS — Z95.5 H/O HEART ARTERY STENT: ICD-10-CM

## 2024-07-01 PROCEDURE — 93798 PHYS/QHP OP CAR RHAB W/ECG: CPT | Performed by: INTERNAL MEDICINE

## 2024-07-02 ENCOUNTER — CLINICAL SUPPORT (OUTPATIENT)
Dept: CARDIAC REHAB | Facility: CLINIC | Age: 69
End: 2024-07-02
Payer: MEDICARE

## 2024-07-02 DIAGNOSIS — Z95.5 H/O HEART ARTERY STENT: ICD-10-CM

## 2024-07-02 PROCEDURE — 93798 PHYS/QHP OP CAR RHAB W/ECG: CPT | Performed by: INTERNAL MEDICINE

## 2024-07-05 ENCOUNTER — CLINICAL SUPPORT (OUTPATIENT)
Dept: CARDIAC REHAB | Facility: CLINIC | Age: 69
End: 2024-07-05
Payer: MEDICARE

## 2024-07-05 DIAGNOSIS — Z95.5 H/O HEART ARTERY STENT: ICD-10-CM

## 2024-07-05 PROCEDURE — 93798 PHYS/QHP OP CAR RHAB W/ECG: CPT | Performed by: INTERNAL MEDICINE

## 2024-07-05 NOTE — PROGRESS NOTES
Paco Gunderson  1955  [id number]  68 y.o.    [facility] [place] [id number]      Cardiac/Pulmonary Rehab Nutrition Education    7/5/2024   Gave and reviewed Heart Healthy Tips handout. Encouraged pt to further read handouts at home and follow up with questions as needed.     Signature Tracey Finn, JOHNN, LD

## 2024-07-08 ENCOUNTER — CLINICAL SUPPORT (OUTPATIENT)
Dept: CARDIAC REHAB | Facility: CLINIC | Age: 69
End: 2024-07-08
Payer: MEDICARE

## 2024-07-08 DIAGNOSIS — Z95.5 H/O HEART ARTERY STENT: ICD-10-CM

## 2024-07-08 PROCEDURE — 93798 PHYS/QHP OP CAR RHAB W/ECG: CPT | Performed by: INTERNAL MEDICINE

## 2024-07-09 ENCOUNTER — CLINICAL SUPPORT (OUTPATIENT)
Dept: CARDIAC REHAB | Facility: CLINIC | Age: 69
End: 2024-07-09
Payer: MEDICARE

## 2024-07-09 DIAGNOSIS — Z95.5 H/O HEART ARTERY STENT: ICD-10-CM

## 2024-07-09 PROCEDURE — 93798 PHYS/QHP OP CAR RHAB W/ECG: CPT | Performed by: INTERNAL MEDICINE

## 2024-07-12 ENCOUNTER — CLINICAL SUPPORT (OUTPATIENT)
Dept: CARDIAC REHAB | Facility: CLINIC | Age: 69
End: 2024-07-12
Payer: MEDICARE

## 2024-07-12 ENCOUNTER — DOCUMENTATION (OUTPATIENT)
Dept: CARDIAC REHAB | Facility: CLINIC | Age: 69
End: 2024-07-12
Payer: MEDICARE

## 2024-07-12 DIAGNOSIS — Z95.5 H/O HEART ARTERY STENT: ICD-10-CM

## 2024-07-12 PROCEDURE — 93798 PHYS/QHP OP CAR RHAB W/ECG: CPT | Performed by: INTERNAL MEDICINE

## 2024-07-12 NOTE — PROGRESS NOTES
"  Cardiac Rehabilitation 30 Day Reassessment    Name: Paco Gunderson  Medical Record Number: [id number]  YOB: 1955  Age: 68 y.o.    Today’s Date: 7/12/2024  Primary Care Physician: Curt Calvo MD  Referring Physician: Curt Calvo MD  Program Location: [address]  Primary Diagnosis:   1. H/O heart artery stent  Referral to Cardiac Rehab         Onset/Date of Diagnosis: 4/29/24  Session # 14  AACVPR Risk Stratification: High   Falls Risk: Low  Psychosocial Assessment   Pre PHQ-9: 1  Sent PH-Q 9 to MD if score > 20: No; score < 20  Pt reported/currently experiencing stress: No  Patient uses stress management skills: No   History of: no history of anxiety or depression  Currently seeing a mental health provider: No  Social Support: Yes, Whom:Saida, spouse and family  Quality of Life Survey: SF-36   SF-36 Pre Post   Physical Component Score  TBD   Mental Component Score  TBD     Learning Assessment:  Learning assessment/barriers: None  Preferred learning method: Reading handout  Barriers: None  Comments:    Stages of Change:Action    Psychosocial Plan  Goal Status: In progress  Psychosocial Goals: Maintain or lower PH-Q 9 score by discharge  Psychosocial Interventions/Education:   5/21/24 PHQ9 score reviewed on initial assessment/mk  6/3/24 Attended mini stress lecture. Handout given for home review/mk  7/12/24 Pt states he has a strong social system in place/MS    Nutrition Assessment:    Hyperlipidemia: Yes   Lipids:   Lab Results   Component Value Date    CHOL 194 04/13/2022    HDL 47.8 04/13/2022       Current Dietary Guidelines: Low fat, Low sodium  Barriers to dietary change: no  Diet Habit Survey: Picture Your Plate  Pre: Initial survey given. Pending completion and return from patient.  Post: To be done at discharge.  Diabetes Assessment  History of Diabetes: No  Weight Management  Height: 193 cm (6' 4\")  Weight: 88.5 kg (195 lb)  BMI (Calculated): 23.75      Nutrition " Plan  Goal Status: In progress  Nutrition Goals: Improve Diet Habit Survey score by 5-10 points by discharge, Adapt a low-sodium, DASH diet prior to discharge, and Learn how to read and interpret nutrition labels prior to discharge  Nutrition Interventions/Education:   6/7/2024  Gave and reviewed Label Reading Highlights with pt. Pt had questions regarding diet and eating Mediterranean. Goes out to eat several times a week and doesn't really know how to cook. Has been eating at [employer]. Discussed how dining out increases sodium intake in foods. Encouraged pt to try cooking some meals to help reduce sodium intake and have better management of sodium intake. Encouraged to follow up with questions as needed. Eun Finn RDN, COURTNEY  6/11/24 Attended mini lecture on sodium. Handout given for review/mk  6/24/24 attended dining out education class, handout given for home review/MK  7/12/24 encouraged pt to complete and return his initial diet survey/MS      Exercise Assessment  Home exercise:  No  Mode: NA  Frequency: NA  Duration: NA    Exercise Prescription     Exercise Prescription based on: current ex rx    DASI Score:     MET Score:     Frequency:  3 days/week   Mode: Treadmill, NuStep, and Recumbent Cycle   Duration: 30 total aerobic minutes   Intensity: RPE 12-14  Target HR:     MET Level: 3.8  Patient wears supplemental O2: No     Modality Workload METs Duration (minutes)   1 Pre-Exercise   2   2 Treadmill 2.2 mph 2.7 10:00   3 NuStep Load 5 @90watts  3.3 10:00   4 Recumbent Bike Load 6 @ 60 rpm 3.8 10:00   6 Post-Exercise   2     Resistance Training: No   Home Exercise Prescription given: To be given prior to discharge from program.    Exercise Plan  Goal Status: In progress  Exercise Goals: Increase exercise MET level by 5-10% each week, Increase total exercise duration to 30-45 minutes, Initiate flexibility training 2-3 days a week, and Establish a home exercise program before  discharge  Exercise Interventions/Education:   24 Adjusted THR/mk  24 gave pt home ex rx & log, encouraged pt to exercise on non-rehab days/MS      Other Core Components/Risk Factor Assessment:    Medication adherence  Current Medications:   Medication Documentation Review Audit       Reviewed by Jessica Riley MA (Medical Assistant) on 24 at 1042      Medication Order Taking? Sig Documenting Provider Last Dose Status   aspirin 81 mg EC tablet [id number] No Take 1 tablet (81 mg) by mouth once daily. Historical Provider, MD Taking Active   atorvastatin (Lipitor) 40 mg tablet [id number] No Take 1 tablet (40 mg) by mouth once daily. Historical Provider, MD Taking Active   clopidogrel (Plavix) 75 mg tablet [id number] No Take 1 tablet (75 mg) by mouth once daily. Historical Provider, MD Taking Active   metoprolol tartrate (Lopressor) 50 mg tablet [id number] No Take 1 tablet by mouth 2 times a day. Historical Provider, MD Taking Active   tamsulosin (Flomax) 0.4 mg 24 hr capsule [id number] No TAKE 1 CAPSULE BY MOUTH ONCE DAILY. Curt Calvo MD Taking Active   traZODone (Desyrel) 50 mg tablet [id number]  Take 1 tablet (50 mg) by mouth as needed at bedtime for sleep. Curt Calvo MD  Active                                 Medication compliance: Yes   Uses pill box/organizer: No    Carries medication list: No     Blood Pressure Management  History of Hypertension: Yes   Medication Changes: No   Resting BP:  Visit Vitals  /68        Heart Failure Management  Hx of Heart Failure: No    Smoking/Tobacco Assessment  Social History     Tobacco Use   Smoking Status Former    Current packs/day: 0.00    Types: Cigarettes    Quit date:     Years since quittin.4   Smokeless Tobacco Never       Other Core Component Plan    Goal Status: In Progress  Other Core Component Goals: Medication compliance and Achieve resting BP of < 130/80 by discharge  Other Core Component Interventions/Education:   24 Resting  blood pressures WNL/mk      Individual Patient Goals:    Back to golfing by session 12  Back to gym by session 16    Goal Status: In progress    Staff Comments:  Mr Gunderson has remains adherent to his cardiac rehab exercise sessions. Thus, his progress on his outcomes and goals remains steady. No ST depression or ectopy noted on telemetry monitor. No cardiac complaints voiced.  He is being evaluated for sleep apnea at [facility].    Rehab Staff Signature: Jeanie Gill RN

## 2024-07-15 ENCOUNTER — CLINICAL SUPPORT (OUTPATIENT)
Dept: CARDIAC REHAB | Facility: CLINIC | Age: 69
End: 2024-07-15
Payer: MEDICARE

## 2024-07-15 DIAGNOSIS — Z95.5 H/O HEART ARTERY STENT: ICD-10-CM

## 2024-07-15 PROCEDURE — 93798 PHYS/QHP OP CAR RHAB W/ECG: CPT | Performed by: INTERNAL MEDICINE

## 2024-07-15 NOTE — PROGRESS NOTES
Education on cholesterol was done during today's session. Discussed with patient the different types of cholesterol, risk factors and how to make lifestyle modifications to improve levels. Handout and patient's recent lab results were given for home review.     Lab Results   Component Value Date    CHOL 194 04/13/2022    HDL 47.8 04/13/2022

## 2024-07-16 ENCOUNTER — APPOINTMENT (OUTPATIENT)
Dept: CARDIAC REHAB | Facility: CLINIC | Age: 69
End: 2024-07-16
Payer: MEDICARE

## 2024-07-19 ENCOUNTER — CLINICAL SUPPORT (OUTPATIENT)
Dept: CARDIAC REHAB | Facility: CLINIC | Age: 69
End: 2024-07-19
Payer: MEDICARE

## 2024-07-19 DIAGNOSIS — Z95.5 H/O HEART ARTERY STENT: ICD-10-CM

## 2024-07-19 PROCEDURE — 93798 PHYS/QHP OP CAR RHAB W/ECG: CPT | Performed by: INTERNAL MEDICINE

## 2024-07-22 ENCOUNTER — APPOINTMENT (OUTPATIENT)
Dept: CARDIAC REHAB | Facility: CLINIC | Age: 69
End: 2024-07-22
Payer: MEDICARE

## 2024-07-26 ENCOUNTER — APPOINTMENT (OUTPATIENT)
Dept: CARDIAC REHAB | Facility: CLINIC | Age: 69
End: 2024-07-26
Payer: MEDICARE

## 2024-07-29 ENCOUNTER — APPOINTMENT (OUTPATIENT)
Dept: CARDIAC REHAB | Facility: CLINIC | Age: 69
End: 2024-07-29
Payer: MEDICARE

## 2024-07-30 ENCOUNTER — APPOINTMENT (OUTPATIENT)
Dept: CARDIAC REHAB | Facility: CLINIC | Age: 69
End: 2024-07-30
Payer: MEDICARE

## 2024-08-02 ENCOUNTER — CLINICAL SUPPORT (OUTPATIENT)
Dept: CARDIAC REHAB | Facility: CLINIC | Age: 69
End: 2024-08-02
Payer: MEDICARE

## 2024-08-02 DIAGNOSIS — Z95.5 H/O HEART ARTERY STENT: ICD-10-CM

## 2024-08-02 PROCEDURE — 93798 PHYS/QHP OP CAR RHAB W/ECG: CPT | Performed by: INTERNAL MEDICINE

## 2024-08-05 ENCOUNTER — CLINICAL SUPPORT (OUTPATIENT)
Dept: CARDIAC REHAB | Facility: CLINIC | Age: 69
End: 2024-08-05
Payer: MEDICARE

## 2024-08-05 DIAGNOSIS — Z95.5 H/O HEART ARTERY STENT: ICD-10-CM

## 2024-08-05 PROCEDURE — 93798 PHYS/QHP OP CAR RHAB W/ECG: CPT | Performed by: INTERNAL MEDICINE

## 2024-08-06 ENCOUNTER — APPOINTMENT (OUTPATIENT)
Dept: CARDIAC REHAB | Facility: CLINIC | Age: 69
End: 2024-08-06
Payer: MEDICARE

## 2024-08-08 NOTE — PROGRESS NOTES
"[facility]  New Visit Note        HISTORY OF PRESENT ILLNESS       HISTORY OF PRESENT ILLNESS   Paco Gunderson is a 69 y.o. male who presents to a [facility] for a sleep medicine evaluation with concerns of Consult, Review Sleep Study Results, Unrefreshing Sleep, and Difficulties Falling Asleep.     PAST SLEEP HISTORY    Patient has the following sleep-related diagnoses and sleep study results: severe NICOLÁS with AHI of 57 spo2 <88% 0.1 minute    CURRENT HISTORY    On today's visit, the patient reports he worries a lot about sleeping due to sleep apnea.  He has had 2 sleep studies in the past but has not heard back regarding the results.    He does report snoring, stopping breathing, waking choking at night.    Reports difficulty falling asleep.  Has prescription for trazodone but has not tried this yet.  Primarily thinks his sleep issues are due to concern about his stopping breathing at night.    He does see cardiology and is going to cardiac rehab today.    NECK 15.75\"      Sleep schedule  on weekdays / work days:  Usual Bedtime  11 p  Falls asleep around  1 a  Wake time  7 a    Sleep schedule  on weekends/non work days :  same    Naps:   occasional    Average sleep duration ? hours/day    Preferred sleeping position: all over    Sleep-related ROS:    Sleep Initiation: takes ~ 2hours to fall asleep    Sleep Maintenance: denies frequent waking    Recreational drug use  Smoking: never  Alcohol consumption: ?  Caffeine consumption:  1/day  Marijuana: never    ESS: 4      PHYSICAL EXAM     VITAL SIGNS: /60   Pulse 76   Ht 1.93 m (6' 4\")   Wt 87.1 kg (192 lb)   SpO2 98%   BMI 23.37 kg/m²      PREVIOUS WEIGHTS:  Wt Readings from Last 3 Encounters:   08/09/24 87.1 kg (192 lb)   06/18/24 89.4 kg (197 lb)   06/06/24 89.4 kg (197 lb)       PHYSICAL EXAM: GENERAL: alert oriented x 3 pleasant and cooperative no acute distress  MODIFIED MALLAMPATI SCORE: " "1+  LATERAL PHARYNGEAL WALL: 2+  NECK EXAM: normal supple no adenopathy    RESULTS/DATA     No results found for: \"IRON\", \"TRANSFERRIN\", \"IRONSAT\", \"TIBC\", \"FERRITIN\"    ASSESSMENT/PLAN     Mr. Gunderson is a 69 y.o. male and was referred to the [facility] for the following issues:    OBSTRUCTIVE SLEEP APNEA / SLEEPINESS / FREQUENT WAKING  -Reviewed test results with patient  -Will order new CPAP from [facility] [place] with pressure 5-15 cm H2O  -Discussed mask options and common tolerance issues  -n30 to start  -Goal of CPAP includes less daytime sleepiness, better sleep quality, better BP control    CHRONIC INSOMNIA  -Reviewed basic sleep hygiene  -Encouraged limiting time in bed - wait until 12 am to get in bed and only get in bed if sleepy  -start trazodone 50 mg PRN at night    HYPERTENSION  BP Readings from Last 3 Encounters:   08/09/24 104/60   06/18/24 101/67   06/06/24 120/66      -Well controlled with current medications    Followup 31-90 days after starting CPAP        "

## 2024-08-09 ENCOUNTER — DOCUMENTATION (OUTPATIENT)
Dept: CARDIAC REHAB | Facility: CLINIC | Age: 69
End: 2024-08-09

## 2024-08-09 ENCOUNTER — CLINICAL SUPPORT (OUTPATIENT)
Dept: CARDIAC REHAB | Facility: CLINIC | Age: 69
End: 2024-08-09
Payer: MEDICARE

## 2024-08-09 ENCOUNTER — APPOINTMENT (OUTPATIENT)
Dept: SLEEP MEDICINE | Facility: CLINIC | Age: 69
End: 2024-08-09
Payer: MEDICARE

## 2024-08-09 VITALS
HEIGHT: 76 IN | HEART RATE: 76 BPM | WEIGHT: 192 LBS | OXYGEN SATURATION: 98 % | BODY MASS INDEX: 23.38 KG/M2 | SYSTOLIC BLOOD PRESSURE: 104 MMHG | DIASTOLIC BLOOD PRESSURE: 60 MMHG

## 2024-08-09 DIAGNOSIS — I10 BENIGN ESSENTIAL HYPERTENSION: Primary | ICD-10-CM

## 2024-08-09 DIAGNOSIS — G47.33 OSA (OBSTRUCTIVE SLEEP APNEA): ICD-10-CM

## 2024-08-09 DIAGNOSIS — G47.00 PERSISTENT DISORDER OF INITIATING OR MAINTAINING SLEEP: ICD-10-CM

## 2024-08-09 DIAGNOSIS — Z95.5 H/O HEART ARTERY STENT: ICD-10-CM

## 2024-08-09 DIAGNOSIS — G47.19 EXCESSIVE DAYTIME SLEEPINESS: ICD-10-CM

## 2024-08-09 PROCEDURE — 1160F RVW MEDS BY RX/DR IN RCRD: CPT | Performed by: PHYSICIAN ASSISTANT

## 2024-08-09 PROCEDURE — 1159F MED LIST DOCD IN RCRD: CPT | Performed by: PHYSICIAN ASSISTANT

## 2024-08-09 PROCEDURE — 1036F TOBACCO NON-USER: CPT | Performed by: PHYSICIAN ASSISTANT

## 2024-08-09 PROCEDURE — 99204 OFFICE O/P NEW MOD 45 MIN: CPT | Performed by: PHYSICIAN ASSISTANT

## 2024-08-09 PROCEDURE — G2211 COMPLEX E/M VISIT ADD ON: HCPCS | Performed by: PHYSICIAN ASSISTANT

## 2024-08-09 PROCEDURE — 1126F AMNT PAIN NOTED NONE PRSNT: CPT | Performed by: PHYSICIAN ASSISTANT

## 2024-08-09 PROCEDURE — 3074F SYST BP LT 130 MM HG: CPT | Performed by: PHYSICIAN ASSISTANT

## 2024-08-09 PROCEDURE — 93798 PHYS/QHP OP CAR RHAB W/ECG: CPT | Performed by: INTERNAL MEDICINE

## 2024-08-09 PROCEDURE — 3008F BODY MASS INDEX DOCD: CPT | Performed by: PHYSICIAN ASSISTANT

## 2024-08-09 PROCEDURE — 3078F DIAST BP <80 MM HG: CPT | Performed by: PHYSICIAN ASSISTANT

## 2024-08-09 ASSESSMENT — PATIENT HEALTH QUESTIONNAIRE - PHQ9
SUM OF ALL RESPONSES TO PHQ9 QUESTIONS 1 & 2: 1
10. IF YOU CHECKED OFF ANY PROBLEMS, HOW DIFFICULT HAVE THESE PROBLEMS MADE IT FOR YOU TO DO YOUR WORK, TAKE CARE OF THINGS AT HOME, OR GET ALONG WITH OTHER PEOPLE: NOT DIFFICULT AT ALL
2. FEELING DOWN, DEPRESSED OR HOPELESS: NOT AT ALL
1. LITTLE INTEREST OR PLEASURE IN DOING THINGS: SEVERAL DAYS

## 2024-08-09 ASSESSMENT — SLEEP AND FATIGUE QUESTIONNAIRES
SITING INACTIVE IN A PUBLIC PLACE LIKE A CLASS ROOM OR A MOVIE THEATER: WOULD NEVER DOZE
HOW LIKELY ARE YOU TO NOD OFF OR FALL ASLEEP WHILE WATCHING TV: SLIGHT CHANCE OF DOZING
HOW LIKELY ARE YOU TO NOD OFF OR FALL ASLEEP WHEN YOU ARE A PASSENGER IN A CAR FOR AN HOUR WITHOUT A BREAK: WOULD NEVER DOZE
HOW LIKELY ARE YOU TO NOD OFF OR FALL ASLEEP WHILE SITTING AND READING: SLIGHT CHANCE OF DOZING
HOW LIKELY ARE YOU TO NOD OFF OR FALL ASLEEP WHILE LYING DOWN TO REST IN THE AFTERNOON WHEN CIRCUMSTANCES PERMIT: SLIGHT CHANCE OF DOZING
HOW LIKELY ARE YOU TO NOD OFF OR FALL ASLEEP IN A CAR, WHILE STOPPED FOR A FEW MINUTES IN TRAFFIC: WOULD NEVER DOZE
HOW LIKELY ARE YOU TO NOD OFF OR FALL ASLEEP WHILE SITTING QUIETLY AFTER LUNCH WITHOUT ALCOHOL: WOULD NEVER DOZE
HOW LIKELY ARE YOU TO NOD OFF OR FALL ASLEEP WHILE SITTING AND TALKING TO SOMEONE: SLIGHT CHANCE OF DOZING
ESS-CHAD TOTAL SCORE: 4

## 2024-08-09 ASSESSMENT — LIFESTYLE VARIABLES
HOW OFTEN DO YOU HAVE A DRINK CONTAINING ALCOHOL: 2-4 TIMES A MONTH
SKIP TO QUESTIONS 9-10: 1
HOW MANY STANDARD DRINKS CONTAINING ALCOHOL DO YOU HAVE ON A TYPICAL DAY: 1 OR 2
AUDIT-C TOTAL SCORE: 2
HOW OFTEN DO YOU HAVE SIX OR MORE DRINKS ON ONE OCCASION: NEVER

## 2024-08-09 ASSESSMENT — PAIN SCALES - GENERAL: PAINLEVEL: 0-NO PAIN

## 2024-08-09 NOTE — PATIENT INSTRUCTIONS
Good seeing you today,    [place] [facility] pressure 5-15 cm H2O. If you feel uncomfortable with pressure settings let me know and I can change them online.    Some mask options I recommend    Resmed N30i nasal mask (tube on top of head) - good option if you toss and turn a lot  Resmed N30 nasal mask (tube in front)  Douglass keely fx nasal pillow mask (tube in front)  Resmed P10 nasal pillow mask (tube in front)  Resmed P30i nasal mask (tube on top of head) - good option if you toss and turn a lot    You can change humidity settings based on comfort    We will plan on seeing you back in 31-90 days for a required compliance appointment. Try to use at least 4 hours per night for >70% of nights.    Dave Green PA-C    IMPORTANT PHONE NUMBERS     [id number] ([id number])  [employer] ([place]): [phone]  For clinical questions and refilling prescriptions: [phone]  Sil Deshpande (For Quirino/Norma): P: [phone]     177.8

## 2024-08-09 NOTE — PROGRESS NOTES
"  Cardiac Rehabilitation 60 Day Reassessment    Name: Paco Gunderson  Medical Record Number: [id number]  YOB: 1955  Age: 69 y.o.    Today’s Date: 8/9/2024  Primary Care Physician: Dmitry Goff DO  Referring Physician: Curt Calvo MD  Program Location: [address]  Primary Diagnosis:   1. H/O heart artery stent  Referral to Cardiac Rehab         Onset/Date of Diagnosis: 4/29/24  Session # 20  AACVPR Risk Stratification: High   Falls Risk: Low  Psychosocial Assessment   Pre PHQ-9: 1  Sent PH-Q 9 to MD if score > 20: No; score < 20  Pt reported/currently experiencing stress: No  Patient uses stress management skills: No   History of: no history of anxiety or depression  Currently seeing a mental health provider: No  Social Support: Yes, Whom:Saida, spouse and family  Quality of Life Survey: SF-36   SF-36 Pre Post   Physical Component Score  TBD   Mental Component Score  TBD     Learning Assessment:  Learning assessment/barriers: None  Preferred learning method: Reading handout  Barriers: None  Comments:    Stages of Change:Action    Psychosocial Plan  Goal Status: In progress  Psychosocial Goals: Maintain or lower PH-Q 9 score by discharge  Psychosocial Interventions/Education:   5/21/24 PHQ9 score reviewed on initial assessment/mk  6/3/24 Attended mini stress lecture. Handout given for home review/mk  7/12/24 Pt states he has a strong social system in place/MS  8/9/24 pt reports no change in psychosocial status/MS  Nutrition Assessment:    Hyperlipidemia: Yes   Lipids:   Lab Results   Component Value Date    CHOL 194 04/13/2022    HDL 47.8 04/13/2022       Current Dietary Guidelines: Low fat, Low sodium  Barriers to dietary change: no  Diet Habit Survey: Picture Your Plate  Pre: Initial survey given. Pending completion and return from patient.  Post: To be done at discharge.  Diabetes Assessment  History of Diabetes: No  Weight Management  Height: 193 cm (6' 4\")  Weight: 88.5 " kg (195 lb)  BMI (Calculated): 23.75      Nutrition Plan  Goal Status: In progress  Nutrition Goals: Improve Diet Habit Survey score by 5-10 points by discharge, Adapt a low-sodium, DASH diet prior to discharge, and Learn how to read and interpret nutrition labels prior to discharge  Nutrition Interventions/Education:   6/7/2024  Gave and reviewed Label Reading Highlights with pt. Pt had questions regarding diet and eating Mediterranean. Goes out to eat several times a week and doesn't really know how to cook. Has been eating at [employer]. Discussed how dining out increases sodium intake in foods. Encouraged pt to try cooking some meals to help reduce sodium intake and have better management of sodium intake. Encouraged to follow up with questions as needed. Signature Tracey Finn RDN, COURTNEY  6/11/24 Attended mini lecture on sodium. Handout given for review/mk  6/24/24 attended dining out education class, handout given for home review/MK  7/12/24 encouraged pt to complete and return his initial diet survey/MS  7/15/24 Education on cholesterol was done during today's session. Discussed with patient the different types of cholesterol, risk factors and how to make lifestyle modifications to improve levels. Handout and patient's recent lab results were given for home review/MK         Exercise Assessment  Home exercise:  No  Mode: NA  Frequency: NA  Duration: NA    Exercise Prescription     Exercise Prescription based on: current ex rx    DASI Score:    MET Score:    Frequency:  3 days/week   Mode: Treadmill, NuStep, and Recumbent Cycle   Duration: 30 total aerobic minutes   Intensity: RPE 12-14  Target HR:     MET Level: 4.9  Patient wears supplemental O2: No     Modality Workload METs Duration (minutes)   1 Pre-Exercise   2   2 Treadmill 2.2 mph @ 1% 3.0 12:00   3 NuStep Load 6 @ 100watts  3.5 12:00   4 Recumbent Bike Load 7 @ 65 rpm 4.9 12:00   6 Post-Exercise   2     Resistance Training: No   Home  Exercise Prescription given: yes    Exercise Plan  Goal Status: In progress  Exercise Goals: Increase exercise MET level by 5-10% each week, Increase total exercise duration to 30-45 minutes, Initiate flexibility training 2-3 days a week, and Establish a home exercise program before discharge  Exercise Interventions/Education:   6/11/24 Adjusted THR/mk  6/25/24 gave pt home ex rx & log, encouraged pt to exercise on non-rehab days/MS      Other Core Components/Risk Factor Assessment:    Medication adherence  Current Medications:   Medication Documentation Review Audit       Reviewed by Dave Green PA-C (Physician Assistant) on 08/09/24 at 0910      Medication Order Taking? Sig Documenting Provider Last Dose Status   aspirin 81 mg EC tablet [id number] Yes Take 1 tablet (81 mg) by mouth once daily. Historical Provider, MD Taking Active   atorvastatin (Lipitor) 40 mg tablet [id number] Yes Take 1 tablet (40 mg) by mouth once daily. Historical Provider, MD Taking Active   clopidogrel (Plavix) 75 mg tablet [id number] Yes Take 1 tablet (75 mg) by mouth once daily. Historical Provider, MD Taking Active   metoprolol tartrate (Lopressor) 50 mg tablet [id number] Yes Take 1 tablet by mouth 2 times a day. Historical Provider, MD Taking Active   tamsulosin (Flomax) 0.4 mg 24 hr capsule [id number] Yes TAKE 1 CAPSULE BY MOUTH ONCE DAILY. Curt Calvo MD Taking Active   traZODone (Desyrel) 50 mg tablet [id number] Yes Take 1 tablet (50 mg) by mouth as needed at bedtime for sleep. Curt Calvo MD Taking Active                                 Medication compliance: Yes   Uses pill box/organizer: No    Carries medication list: No     Blood Pressure Management  History of Hypertension: Yes   Medication Changes: No   Resting BP:  Visit Vitals  /68        Heart Failure Management  Hx of Heart Failure: No    Smoking/Tobacco Assessment  Social History     Tobacco Use   Smoking Status Former    Current packs/day: 0.00    Types:  Cigarettes    Quit date:     Years since quittin.4   Smokeless Tobacco Never       Other Core Component Plan    Goal Status: Met  Other Core Component Goals: Medication compliance and Achieve resting BP of < 130/80 by discharge  Other Core Component Interventions/Education:   24 Resting blood pressures WNL/mk      Individual Patient Goals:    Back to golfing by session 12  Back to gym by session 16    Goal Status: In progress    Staff Comments:  Mr. Gunderson missed several exercise sessions due to covid since his last progress report.  He has returned and is tolerating his workloads well.  No ST depression or ectopy noted on telemetry monitor. No cardiac complaints voiced.      Rehab Staff Signature: Jeanie Gill RN

## 2024-08-12 ENCOUNTER — CLINICAL SUPPORT (OUTPATIENT)
Dept: CARDIAC REHAB | Facility: CLINIC | Age: 69
End: 2024-08-12
Payer: MEDICARE

## 2024-08-12 DIAGNOSIS — Z95.5 H/O HEART ARTERY STENT: ICD-10-CM

## 2024-08-12 PROCEDURE — 93798 PHYS/QHP OP CAR RHAB W/ECG: CPT | Performed by: INTERNAL MEDICINE

## 2024-08-12 NOTE — PROGRESS NOTES
Cardiac Rehab Education    8/12/2024  Mini lecture on Cardiovascular Changes with Exercise. Handout given for home review.    Signature Sola Sanchez RN

## 2024-08-13 ENCOUNTER — APPOINTMENT (OUTPATIENT)
Dept: CARDIAC REHAB | Facility: CLINIC | Age: 69
End: 2024-08-13
Payer: MEDICARE

## 2024-08-16 ENCOUNTER — CLINICAL SUPPORT (OUTPATIENT)
Dept: CARDIAC REHAB | Facility: CLINIC | Age: 69
End: 2024-08-16
Payer: MEDICARE

## 2024-08-16 DIAGNOSIS — Z95.5 H/O HEART ARTERY STENT: ICD-10-CM

## 2024-08-16 PROCEDURE — 93798 PHYS/QHP OP CAR RHAB W/ECG: CPT | Performed by: INTERNAL MEDICINE

## 2024-08-19 ENCOUNTER — CLINICAL SUPPORT (OUTPATIENT)
Dept: CARDIAC REHAB | Facility: CLINIC | Age: 69
End: 2024-08-19
Payer: MEDICARE

## 2024-08-19 DIAGNOSIS — Z95.5 H/O HEART ARTERY STENT: ICD-10-CM

## 2024-08-19 PROCEDURE — 93798 PHYS/QHP OP CAR RHAB W/ECG: CPT | Performed by: INTERNAL MEDICINE

## 2024-08-20 ENCOUNTER — APPOINTMENT (OUTPATIENT)
Dept: CARDIAC REHAB | Facility: CLINIC | Age: 69
End: 2024-08-20
Payer: MEDICARE

## 2024-08-23 ENCOUNTER — CLINICAL SUPPORT (OUTPATIENT)
Dept: CARDIAC REHAB | Facility: CLINIC | Age: 69
End: 2024-08-23
Payer: MEDICARE

## 2024-08-23 DIAGNOSIS — Z95.5 H/O HEART ARTERY STENT: ICD-10-CM

## 2024-08-23 PROCEDURE — 93798 PHYS/QHP OP CAR RHAB W/ECG: CPT | Performed by: INTERNAL MEDICINE

## 2024-08-23 NOTE — PROGRESS NOTES
Cardiac Rehab Education    8/23/2024  Discussion of prescribed drug names, doses, side effects, and medication uses was done with patient. Medication education page was completed and given to patient. Handouts were given for home review and patient was instructed to come back with any questions.      Signature Sola Sanchez RN

## 2024-08-26 ENCOUNTER — CLINICAL SUPPORT (OUTPATIENT)
Dept: CARDIAC REHAB | Facility: CLINIC | Age: 69
End: 2024-08-26
Payer: MEDICARE

## 2024-08-26 DIAGNOSIS — Z95.5 H/O HEART ARTERY STENT: ICD-10-CM

## 2024-08-26 PROCEDURE — 93798 PHYS/QHP OP CAR RHAB W/ECG: CPT | Performed by: INTERNAL MEDICINE

## 2024-08-30 ENCOUNTER — CLINICAL SUPPORT (OUTPATIENT)
Dept: CARDIAC REHAB | Facility: CLINIC | Age: 69
End: 2024-08-30
Payer: MEDICARE

## 2024-08-30 DIAGNOSIS — Z95.5 H/O HEART ARTERY STENT: ICD-10-CM

## 2024-08-30 PROCEDURE — 93798 PHYS/QHP OP CAR RHAB W/ECG: CPT | Performed by: INTERNAL MEDICINE

## 2024-09-03 ENCOUNTER — CLINICAL SUPPORT (OUTPATIENT)
Dept: CARDIAC REHAB | Facility: CLINIC | Age: 69
End: 2024-09-03
Payer: MEDICARE

## 2024-09-03 DIAGNOSIS — Z95.5 H/O HEART ARTERY STENT: ICD-10-CM

## 2024-09-03 PROCEDURE — 93798 PHYS/QHP OP CAR RHAB W/ECG: CPT | Performed by: INTERNAL MEDICINE

## 2024-09-06 ENCOUNTER — CLINICAL SUPPORT (OUTPATIENT)
Dept: CARDIAC REHAB | Facility: CLINIC | Age: 69
End: 2024-09-06
Payer: MEDICARE

## 2024-09-06 ENCOUNTER — DOCUMENTATION (OUTPATIENT)
Dept: CARDIAC REHAB | Facility: CLINIC | Age: 69
End: 2024-09-06

## 2024-09-06 DIAGNOSIS — Z95.5 H/O HEART ARTERY STENT: ICD-10-CM

## 2024-09-06 PROCEDURE — 93798 PHYS/QHP OP CAR RHAB W/ECG: CPT | Performed by: INTERNAL MEDICINE

## 2024-09-06 NOTE — PROGRESS NOTES
Cardiac Rehabilitation 90 Day Reassessment    Name: Paco Gunderson  Medical Record Number: [id number]  YOB: 1955  Age: 69 y.o.    Today’s Date: 9/6/2024  Primary Care Physician: Dmitry Goff DO  Referring Physician: Curt Calvo MD  Program Location: [address]  Primary Diagnosis:   1. H/O heart artery stent  Referral to Cardiac Rehab         Onset/Date of Diagnosis: 4/29/24  Session # 28  AACVPR Risk Stratification: High   Falls Risk: Low  Psychosocial Assessment   Pre PHQ-9: 1  Sent PH-Q 9 to MD if score > 20: No; score < 20  Pt reported/currently experiencing stress: No  Patient uses stress management skills: No   History of: no history of anxiety or depression  Currently seeing a mental health provider: No  Social Support: Yes, Whom:Saida, spouse and family  Quality of Life Survey: SF-36   SF-36 Pre Post   Physical Component Score  TBD   Mental Component Score  TBD     Learning Assessment:  Learning assessment/barriers: None  Preferred learning method: Reading handout  Barriers: None  Comments:    Stages of Change:Action    Psychosocial Plan  Goal Status: In progress  Psychosocial Goals: Maintain or lower PH-Q 9 score by discharge  Psychosocial Interventions/Education:   5/21/24 PHQ9 score reviewed on initial assessment/mk  6/3/24 Attended mini stress lecture. Handout given for home review/mk  7/12/24 Pt states he has a strong social system in place/MS  8/9/24 pt reports no change in psychosocial status/MS  9/6/24 No psycho social issues voiced/mk  Nutrition Assessment:    Hyperlipidemia: Yes   Lipids:   Lab Results   Component Value Date    CHOL 194 04/13/2022    HDL 47.8 04/13/2022       Current Dietary Guidelines: Low fat, Low sodium  Barriers to dietary change: no  Diet Habit Survey: Picture Your Plate  Pre: Initial survey given. Pending completion and return from patient.  Post: To be done at discharge.  Diabetes Assessment  History of Diabetes: No  Weight  "Management  Height: 193 cm (6' 4\")  Weight: 88.5 kg (195 lb)  BMI (Calculated): 23.75      Nutrition Plan  Goal Status: In progress  Nutrition Goals: Improve Diet Habit Survey score by 5-10 points by discharge, Adapt a low-sodium, DASH diet prior to discharge, and Learn how to read and interpret nutrition labels prior to discharge  Nutrition Interventions/Education:   6/7/2024  Gave and reviewed Label Reading Highlights with pt. Pt had questions regarding diet and eating Mediterranean. Goes out to eat several times a week and doesn't really know how to cook. Has been eating at [employer]. Discussed how dining out increases sodium intake in foods. Encouraged pt to try cooking some meals to help reduce sodium intake and have better management of sodium intake. Encouraged to follow up with questions as needed. Eun Finn RDN, COURTNEY  6/11/24 Attended mini lecture on sodium. Handout given for review/mk  6/24/24 attended dining out education class, handout given for home review/MK  7/12/24 encouraged pt to complete and return his initial diet survey/MS  7/15/24 Education on cholesterol was done during today's session. Discussed with patient the different types of cholesterol, risk factors and how to make lifestyle modifications to improve levels. Handout and patient's recent lab results were given for home review/MK   9/6/2024  Patient attended Heart Healthy Diet lecture with program MIRIAM during today's exercise session. Heart Healthy diet tips sheet was given for further review at home and patient was encouraged to come back with any follow up questions.Eun Finn RDN, COURTNEY    Exercise Assessment  Home exercise:  No  Mode: NA  Frequency: NA  Duration: NA    Exercise Prescription     Exercise Prescription based on: current ex rx    DASI Score:    MET Score:    Frequency:  3 days/week   Mode: Treadmill, NuStep, and Recumbent Cycle   Duration: 30 total aerobic minutes   Intensity: RPE " 12-14  Target HR:     MET Level: 4.9  Patient wears supplemental O2: No     Modality Workload METs Duration (minutes)   1 Pre-Exercise   2   2 Treadmill 2.3 mph @ 2% 3.4 14:00   3 NuStep Load 7@ [address]  4.1 14:00   4 Recumbent Bike Load 7 @ 65 rpm 4.9 14:00   6 Post-Exercise   2     Resistance Training: No   Home Exercise Prescription given: yes    Exercise Plan  Goal Status: In progress  Exercise Goals: Increase exercise MET level by 5-10% each week, Increase total exercise duration to 30-45 minutes, Initiate flexibility training 2-3 days a week, and Establish a home exercise program before discharge  Exercise Interventions/Education:   6/11/24 Adjusted THR/mk  6/25/24 gave pt home ex rx & log, encouraged pt to exercise on non-rehab days/MS  8/12/2024  Mini lecture on Cardiovascular Changes with Exercise. Handout given for home review./MS       Other Core Components/Risk Factor Assessment:    Medication adherence  Current Medications:   Medication Documentation Review Audit       Reviewed by Dave rGeen PA-C (Physician Assistant) on 08/09/24 at 0910      Medication Order Taking? Sig Documenting Provider Last Dose Status   aspirin 81 mg EC tablet [id number] Yes Take 1 tablet (81 mg) by mouth once daily. Historical Provider, MD Taking Active   atorvastatin (Lipitor) 40 mg tablet [id number] Yes Take 1 tablet (40 mg) by mouth once daily. Historical Provider, MD Taking Active   clopidogrel (Plavix) 75 mg tablet [id number] Yes Take 1 tablet (75 mg) by mouth once daily. Historical Provider, MD Taking Active   metoprolol tartrate (Lopressor) 50 mg tablet [id number] Yes Take 1 tablet by mouth 2 times a day. Historical Provider, MD Taking Active   tamsulosin (Flomax) 0.4 mg 24 hr capsule [id number] Yes TAKE 1 CAPSULE BY MOUTH ONCE DAILY. Curt Calvo MD Taking Active   traZODone (Desyrel) 50 mg tablet [id number] Yes Take 1 tablet (50 mg) by mouth as needed at bedtime for sleep. Curt Calvo MD Taking Active                                  Medication compliance: Yes   Uses pill box/organizer: No    Carries medication list: No     Blood Pressure Management  History of Hypertension: Yes   Medication Changes: No   Resting BP:  Visit Vitals  /68        Heart Failure Management  Hx of Heart Failure: No    Smoking/Tobacco Assessment  Social History     Tobacco Use   Smoking Status Former    Current packs/day: 0.00    Types: Cigarettes    Quit date:     Years since quittin.4   Smokeless Tobacco Never       Other Core Component Plan    Goal Status: Met  Other Core Component Goals: Medication compliance and Achieve resting BP of < 130/80 by discharge  Other Core Component Interventions/Education:   24 Resting blood pressures WNL/mk  2024  Discussion of prescribed drug names, doses, side effects, and medication uses was done with patient. Medication education page was completed and given to patient. Handouts were given for home review and patient was instructed to come back with any questions.  Signature Sola Sanchez RN                        Individual Patient Goals:    Back to golfing by session 12  Back to gym by session 16    Goal Status:  24 Pt golfing without difficulty/mk    Staff Comments:  Mr Gunderson is progressing well since he has returned from his COVID absence. No ST depression noted. Continues to complain of chronic fatigue.     Rehab Staff Signature: Sola Sanchez RN

## 2024-09-06 NOTE — PROGRESS NOTES
Paco Gunderson  1955  [id number]  69 y.o.    [facility] [id number]      Cardiac/Pulmonary Rehab Nutrition Education    9/6/2024  Patient attended Heart Healthy Diet lecture with program RDN during today's exercise session. Heart Healthy diet tips sheet was given for further review at home and patient was encouraged to come back with any follow up questions.    Signature Tracey Finn RDN, LD

## 2024-09-09 ENCOUNTER — CLINICAL SUPPORT (OUTPATIENT)
Dept: CARDIAC REHAB | Facility: CLINIC | Age: 69
End: 2024-09-09
Payer: MEDICARE

## 2024-09-09 DIAGNOSIS — Z95.5 H/O HEART ARTERY STENT: ICD-10-CM

## 2024-09-09 PROCEDURE — 93798 PHYS/QHP OP CAR RHAB W/ECG: CPT | Performed by: INTERNAL MEDICINE

## 2024-09-12 ENCOUNTER — APPOINTMENT (OUTPATIENT)
Dept: PRIMARY CARE | Facility: CLINIC | Age: 69
End: 2024-09-12
Payer: MEDICARE

## 2024-09-12 VITALS
BODY MASS INDEX: 23.87 KG/M2 | HEART RATE: 75 BPM | OXYGEN SATURATION: 96 % | DIASTOLIC BLOOD PRESSURE: 81 MMHG | SYSTOLIC BLOOD PRESSURE: 126 MMHG | WEIGHT: 196 LBS | HEIGHT: 76 IN

## 2024-09-12 DIAGNOSIS — I10 HYPERTENSION, UNSPECIFIED TYPE: ICD-10-CM

## 2024-09-12 DIAGNOSIS — E78.5 DYSLIPIDEMIA: ICD-10-CM

## 2024-09-12 DIAGNOSIS — I25.10 CORONARY ARTERY DISEASE INVOLVING NATIVE CORONARY ARTERY OF NATIVE HEART WITHOUT ANGINA PECTORIS: ICD-10-CM

## 2024-09-12 DIAGNOSIS — G47.33 OSA ON CPAP: ICD-10-CM

## 2024-09-12 DIAGNOSIS — D36.9 TUBULAR ADENOMA: ICD-10-CM

## 2024-09-12 DIAGNOSIS — Z00.00 ROUTINE GENERAL MEDICAL EXAMINATION AT HEALTH CARE FACILITY: Primary | ICD-10-CM

## 2024-09-12 PROCEDURE — 3079F DIAST BP 80-89 MM HG: CPT | Performed by: INTERNAL MEDICINE

## 2024-09-12 PROCEDURE — 1160F RVW MEDS BY RX/DR IN RCRD: CPT | Performed by: INTERNAL MEDICINE

## 2024-09-12 PROCEDURE — 1170F FXNL STATUS ASSESSED: CPT | Performed by: INTERNAL MEDICINE

## 2024-09-12 PROCEDURE — 3074F SYST BP LT 130 MM HG: CPT | Performed by: INTERNAL MEDICINE

## 2024-09-12 PROCEDURE — 1126F AMNT PAIN NOTED NONE PRSNT: CPT | Performed by: INTERNAL MEDICINE

## 2024-09-12 PROCEDURE — 1159F MED LIST DOCD IN RCRD: CPT | Performed by: INTERNAL MEDICINE

## 2024-09-12 PROCEDURE — 3008F BODY MASS INDEX DOCD: CPT | Performed by: INTERNAL MEDICINE

## 2024-09-12 PROCEDURE — G0439 PPPS, SUBSEQ VISIT: HCPCS | Performed by: INTERNAL MEDICINE

## 2024-09-12 PROCEDURE — 99213 OFFICE O/P EST LOW 20 MIN: CPT | Performed by: INTERNAL MEDICINE

## 2024-09-12 ASSESSMENT — ACTIVITIES OF DAILY LIVING (ADL)
DRESSING: INDEPENDENT
TAKING_MEDICATION: INDEPENDENT
BATHING: INDEPENDENT
DOING_HOUSEWORK: INDEPENDENT
GROCERY_SHOPPING: INDEPENDENT
MANAGING_FINANCES: INDEPENDENT

## 2024-09-12 ASSESSMENT — ENCOUNTER SYMPTOMS
DEPRESSION: 0
LOSS OF SENSATION IN FEET: 0
OCCASIONAL FEELINGS OF UNSTEADINESS: 0

## 2024-09-12 ASSESSMENT — PAIN SCALES - GENERAL: PAINLEVEL: 0-NO PAIN

## 2024-09-12 ASSESSMENT — PATIENT HEALTH QUESTIONNAIRE - PHQ9
1. LITTLE INTEREST OR PLEASURE IN DOING THINGS: NOT AT ALL
2. FEELING DOWN, DEPRESSED OR HOPELESS: NOT AT ALL
SUM OF ALL RESPONSES TO PHQ9 QUESTIONS 1 AND 2: 0
1. LITTLE INTEREST OR PLEASURE IN DOING THINGS: NOT AT ALL
2. FEELING DOWN, DEPRESSED OR HOPELESS: NOT AT ALL
SUM OF ALL RESPONSES TO PHQ9 QUESTIONS 1 AND 2: 0

## 2024-09-12 NOTE — PROGRESS NOTES
"Subjective   Reason for Visit: Paco Gunderson is an 69 y.o. male here for a Medicare Wellness visit.          Reviewed all medications by prescribing practitioner or clinical pharmacist (such as prescriptions, OTCs, herbal therapies and supplements) and documented in the medical record.    HPI Patient presents to clinic  for follow-up visit on coronary artery disease status post angioplasty and drug-eluting stenting of proximal and mid LAD on 4/29/2024, obstructive sleep apnea, multiple colonic polyp, and tubular adenoma He  is doing well and more recently seen [facility] emergency room  COVID infection.  Laboratory studies done in the emergency room revealed creatinine of 1.53 sodium of 134 GFR of 49, D-dimer of 640 and other studies have been reviewed and discussed with him.  He is also here for Medicare annual wellness exam.  He is doing well and denies any chest pain, cough congestion abdominal pain fever chills and swelling of the legs.       Patient Care Team:  Dmitry Goff DO as PCP - General (Family Medicine)  Curt Calvo MD as PCP - [employer] PCP     Review of Systems   Constitutional: Negative.    HENT: Negative.     Eyes: Negative.    Respiratory: Negative.     Cardiovascular: Negative.    Gastrointestinal: Negative.    Endocrine: Negative.    Genitourinary: Negative.    Musculoskeletal: Negative.    Skin: Negative.    Allergic/Immunologic: Negative.    Neurological: Negative.    Hematological: Negative.    Psychiatric/Behavioral: Negative.         Objective   Vitals:  /81 (BP Location: Left arm, Patient Position: Sitting)   Pulse 75   Ht 1.93 m (6' 4\")   Wt 88.9 kg (196 lb)   SpO2 96%   BMI 23.86 kg/m²       Physical Exam  Constitutional:       Appearance: Normal appearance. He is normal weight.   HENT:      Right Ear: Tympanic membrane normal.      Left Ear: Tympanic membrane and ear canal normal.      Nose: Nose normal.   Neck:      Vascular: No carotid bruit. "   Cardiovascular:      Rate and Rhythm: Normal rate.   Pulmonary:      Effort: No respiratory distress.      Breath sounds: No stridor. No wheezing.   Abdominal:      Palpations: Abdomen is soft.      Tenderness: There is no abdominal tenderness. There is no guarding or rebound.   Skin:     Coloration: Skin is not jaundiced.      Findings: No bruising.   Neurological:      General: No focal deficit present.      Mental Status: He is alert and oriented to person, place, and time.   Psychiatric:         Mood and Affect: Mood normal.         Assessment & Plan  Routine general medical examination at health care facility  Patient advised to consider influenza and COVID 19 vaccinations  for health maintenance  Orders:    1 Year Follow Up In Primary Care - Wellness Exam; Future    Coronary artery disease involving native coronary artery of native heart without angina pectoris  .  Continue aspirin metoprolol and Plavix regarding history of coronary artery disease.  He will also follow-up with cardiology clinic  Orders:    Basic metabolic panel; Future    Urinalysis with Reflex Microscopic; Future    Dyslipidemia  He will continue atorvastatin 40 mg daily regarding dyslipidemia.       NICOLÁS on CPAP  Encouraged to use CPAP regarding history of obstructive sleep apnea       Tubular adenoma  He will follow-up with gastroenterology regarding history of colonic polyp       Hypertension, unspecified type  He will continue metoprolol for hypertension.  He will be scheduled for routine blood work and will return to clinic in 3 months for follow-up visit  Orders:    Urinalysis with Reflex Microscopic; Future

## 2024-09-13 ENCOUNTER — CLINICAL SUPPORT (OUTPATIENT)
Dept: CARDIAC REHAB | Facility: CLINIC | Age: 69
End: 2024-09-13
Payer: MEDICARE

## 2024-09-13 DIAGNOSIS — Z95.5 H/O HEART ARTERY STENT: ICD-10-CM

## 2024-09-13 PROCEDURE — 93798 PHYS/QHP OP CAR RHAB W/ECG: CPT | Performed by: INTERNAL MEDICINE

## 2024-09-13 NOTE — PROGRESS NOTES
Cardiac Rehab Education    9/13/2024 Education on sodium intake was done. Discussed with patient the risks of excess levels of sodium and how to decrease dietary intake with provided list of substitutions. Handouts were given for home reference.           Signature Sola Sanchez RN

## 2024-09-16 ENCOUNTER — CLINICAL SUPPORT (OUTPATIENT)
Dept: CARDIAC REHAB | Facility: CLINIC | Age: 69
End: 2024-09-16
Payer: MEDICARE

## 2024-09-16 DIAGNOSIS — Z95.5 H/O HEART ARTERY STENT: ICD-10-CM

## 2024-09-16 PROCEDURE — 93798 PHYS/QHP OP CAR RHAB W/ECG: CPT | Performed by: INTERNAL MEDICINE

## 2024-09-16 ASSESSMENT — ENCOUNTER SYMPTOMS
CONSTITUTIONAL NEGATIVE: 1
EYES NEGATIVE: 1
NEUROLOGICAL NEGATIVE: 1
RESPIRATORY NEGATIVE: 1
MUSCULOSKELETAL NEGATIVE: 1
CARDIOVASCULAR NEGATIVE: 1
ENDOCRINE NEGATIVE: 1
PSYCHIATRIC NEGATIVE: 1
HEMATOLOGIC/LYMPHATIC NEGATIVE: 1
GASTROINTESTINAL NEGATIVE: 1
ALLERGIC/IMMUNOLOGIC NEGATIVE: 1

## 2024-09-20 ENCOUNTER — CLINICAL SUPPORT (OUTPATIENT)
Dept: CARDIAC REHAB | Facility: CLINIC | Age: 69
End: 2024-09-20
Payer: MEDICARE

## 2024-09-20 DIAGNOSIS — Z95.5 H/O HEART ARTERY STENT: ICD-10-CM

## 2024-09-20 PROCEDURE — 93798 PHYS/QHP OP CAR RHAB W/ECG: CPT | Performed by: INTERNAL MEDICINE

## 2024-09-20 NOTE — PROGRESS NOTES
Paco Gunderson  1955  [id number]  69 y.o.    [facility] [id number] [place]I      Cardiac/Pulmonary Rehab Nutrition Education    9/20/2024  Education done on dining out. Dining out handout given for further review at home. Encouraged to follow up with questions as needed    Signature Tracey Finn RDN, LD

## 2024-09-23 ENCOUNTER — CLINICAL SUPPORT (OUTPATIENT)
Dept: CARDIAC REHAB | Facility: CLINIC | Age: 69
End: 2024-09-23
Payer: MEDICARE

## 2024-09-23 DIAGNOSIS — Z95.5 H/O HEART ARTERY STENT: ICD-10-CM

## 2024-09-23 PROCEDURE — 93798 PHYS/QHP OP CAR RHAB W/ECG: CPT | Performed by: INTERNAL MEDICINE

## 2024-09-24 ENCOUNTER — CLINICAL SUPPORT (OUTPATIENT)
Dept: CARDIAC REHAB | Facility: CLINIC | Age: 69
End: 2024-09-24
Payer: MEDICARE

## 2024-09-24 DIAGNOSIS — Z95.5 STENTED CORONARY ARTERY: ICD-10-CM

## 2024-09-24 PROCEDURE — 93798 PHYS/QHP OP CAR RHAB W/ECG: CPT | Performed by: INTERNAL MEDICINE

## 2024-09-27 ENCOUNTER — CLINICAL SUPPORT (OUTPATIENT)
Dept: CARDIAC REHAB | Facility: CLINIC | Age: 69
End: 2024-09-27
Payer: MEDICARE

## 2024-09-27 DIAGNOSIS — Z95.5 H/O HEART ARTERY STENT: ICD-10-CM

## 2024-09-27 PROCEDURE — 93798 PHYS/QHP OP CAR RHAB W/ECG: CPT | Performed by: INTERNAL MEDICINE

## 2024-09-27 NOTE — PROGRESS NOTES
Cardiac Rehab Education  Paco JULIA Kamjulia   [id number]    9/27/2024  Attended mini lecture on HTN/Stroke. Handout given.    Signature Sola Sanchez RN

## 2024-09-30 ENCOUNTER — CLINICAL SUPPORT (OUTPATIENT)
Dept: CARDIAC REHAB | Facility: CLINIC | Age: 69
End: 2024-09-30
Payer: MEDICARE

## 2024-09-30 ENCOUNTER — DOCUMENTATION (OUTPATIENT)
Dept: CARDIAC REHAB | Facility: CLINIC | Age: 69
End: 2024-09-30

## 2024-09-30 DIAGNOSIS — Z95.5 H/O HEART ARTERY STENT: ICD-10-CM

## 2024-09-30 PROCEDURE — 93798 PHYS/QHP OP CAR RHAB W/ECG: CPT | Performed by: INTERNAL MEDICINE

## 2024-09-30 NOTE — PROGRESS NOTES
Cardiac Rehabilitation Discharge Report    Name: Paco Gunderson  Medical Record Number: [id number]  YOB: 1955  Age: 69 y.o.    Today’s Date: 9/30/2024  Primary Care Physician: Dmitry Goff DO  Referring Physician: Curt Calvo MD  Program Location: [address]  Primary Diagnosis:   1. H/O heart artery stent  Referral to Cardiac Rehab         Onset/Date of Diagnosis: 4/29/24  Session # 36  AACVPR Risk Stratification: High   Falls Risk: Low  Psychosocial Assessment   Pre PHQ-9: 1  Sent PH-Q 9 to MD if score > 20: No; score < 20  Post PHQ-9:  4  Pt reported/currently experiencing stress: No  Patient uses stress management skills: No   History of: no history of anxiety or depression  Currently seeing a mental health provider: No  Social Support: Yes, Whom:Saida, spouse and family  Quality of Life Survey: SF-36   SF-36 Pre Post   Physical Component Score  TBD   Mental Component Score  TBD     Learning Assessment:  Learning assessment/barriers: None  Preferred learning method: Reading handout  Barriers: None  Comments:    Stages of Change:Maintenance    Psychosocial Plan  Goal Status: In progress  Psychosocial Goals: Maintain or lower PH-Q 9 score by discharge  Psychosocial Interventions/Education:   5/21/24 PHQ9 score reviewed on initial assessment/  6/3/24 Attended mini stress lecture. Handout given for home review/  7/12/24 Pt states he has a strong social system in place/MS  8/9/24 pt reports no change in psychosocial status/MS  9/6/24 No psycho social issues voiced/  9/30/24 outcome remains in progress as post PHQ-9 score increased by 3 pts/MS  Nutrition Assessment:    Hyperlipidemia: Yes   Lipids:   Lab Results   Component Value Date    CHOL 194 04/13/2022    HDL 47.8 04/13/2022       Current Dietary Guidelines: Low fat, Low sodium  Barriers to dietary change: no  Diet Habit Survey: Picture Your Plate  Pre: Initial survey given. Pending completion and return from  "patient.  Post: 58  Diabetes Assessment  History of Diabetes: No  Weight Management  Height: 193 cm (6' 4\")  Weight: 88.5 kg (195 lb)  BMI (Calculated): 23.75      Nutrition Plan  Goal Status: Met  Nutrition Goals: Improve Diet Habit Survey score by 5-10 points by discharge, Adapt a low-sodium, DASH diet prior to discharge, and Learn how to read and interpret nutrition labels prior to discharge  Nutrition Interventions/Education:   6/7/2024  Gave and reviewed Label Reading Highlights with pt. Pt had questions regarding diet and eating Mediterranean. Goes out to eat several times a week and doesn't really know how to cook. Has been eating at [employer]. Discussed how dining out increases sodium intake in foods. Encouraged pt to try cooking some meals to help reduce sodium intake and have better management of sodium intake. Encouraged to follow up with questions as needed. Eun Finn RDN, COURTNEY  6/11/24 Attended mini lecture on sodium. Handout given for review/mk  6/24/24 attended dining out education class, handout given for home review/MK  7/12/24 encouraged pt to complete and return his initial diet survey/MS  7/15/24 Education on cholesterol was done during today's session. Discussed with patient the different types of cholesterol, risk factors and how to make lifestyle modifications to improve levels. Handout and patient's recent lab results were given for home review/MK   9/6/2024  Patient attended Heart Healthy Diet lecture with program MIRIAM during today's exercise session. Heart Healthy diet tips sheet was given for further review at home and patient was encouraged to come back with any follow up questions.Eun Finn RDN, COURTNEY  9/13/2024 Education on sodium intake was done. Discussed with patient the risks of excess levels of sodium and how to decrease dietary intake with provided list of substitutions. Handouts were given for home reference/MK  9/20/2024  Education done " on dining out. Dining out handout given for further review at home. Encouraged to follow up with questions as needed/TOMAS      Exercise Assessment  Home exercise:  No  Mode: NA  Frequency: NA  Duration: NA    Exercise Prescription     Exercise Prescription based on: current ex rx   Frequency:  3 days/week   Mode: Treadmill, NuStep, and Recumbent Cycle   Duration: 45 total aerobic minutes   Intensity: RPE 12-14  Target HR:     MET Level:8.0  Patient wears supplemental O2: No     Modality Workload METs Duration (minutes)   1 Pre-Exercise   2   2 Treadmill 2.4 mph @ 3% 3.9 15:00   3 NuStep Load 9@ 216watts  8.0 15:00   4 Recumbent Bike Load 8 @ 65 rpm 5.6 15:00   6 Post-Exercise   2     Resistance Training: No   Home Exercise Prescription given: yes    Exercise Plan  Goal Status: Met  Exercise Goals: Increase exercise MET level by 5-10% each week, Increase total exercise duration to 30-45 minutes, Initiate flexibility training 2-3 days a week, and Establish a home exercise program before discharge  Exercise Interventions/Education:   6/11/24 Adjusted THR/mk  6/25/24 gave pt home ex rx & log, encouraged pt to exercise on non-rehab days/MS  8/12/2024  Mini lecture on Cardiovascular Changes with Exercise. Handout given for home review./MS       Other Core Components/Risk Factor Assessment:    Medication adherence  Current Medications:   Medication Documentation Review Audit       Reviewed by Curt Calvo MD (Physician) on 09/12/24 at 0939      Medication Order Taking? Sig Documenting Provider Last Dose Status   aspirin 81 mg EC tablet [id number] Yes Take 1 tablet (81 mg) by mouth once daily. Historical Provider, MD Taking Active   atorvastatin (Lipitor) 40 mg tablet [id number] Yes Take 1 tablet (40 mg) by mouth once daily. Historical Provider, MD Taking Active   clopidogrel (Plavix) 75 mg tablet [id number] Yes Take 1 tablet (75 mg) by mouth once daily. Historical Provider, MD Taking Active   metoprolol tartrate  (Lopressor) 50 mg tablet [id number] Yes Take 1 tablet by mouth 2 times a day. Historical Provider, MD Taking Active   tamsulosin (Flomax) 0.4 mg 24 hr capsule [id number] Yes TAKE 1 CAPSULE BY MOUTH ONCE DAILY. Curt Calvo MD Taking Active   traZODone (Desyrel) 50 mg tablet [id number] Yes Take 1 tablet (50 mg) by mouth as needed at bedtime for sleep. Curt Calvo MD Taking Active                                 Medication compliance: Yes   Uses pill box/organizer: No    Carries medication list: No     Blood Pressure Management  History of Hypertension: Yes   Medication Changes: No   Resting BP:  Visit Vitals  /68        Heart Failure Management  Hx of Heart Failure: No    Smoking/Tobacco Assessment  Social History     Tobacco Use   Smoking Status Former    Current packs/day: 0.00    Types: Cigarettes    Quit date:     Years since quittin.4   Smokeless Tobacco Never       Other Core Component Plan    Goal Status: Met  Other Core Component Goals: Medication compliance and Achieve resting BP of < 130/80 by discharge  Other Core Component Interventions/Education:   24 Resting blood pressures WNL/mk  2024  Discussion of prescribed drug names, doses, side effects, and medication uses was done with patient. Medication education page was completed and given to patient. Handouts were given for home review and patient was instructed to come back with any questions.  Signature Sola Sanchez RN            2024  Attended mini lecture on HTN/Stroke. Handout given/MK      Individual Patient Goals:    Back to golfing by session 12 - Met 24 Pt golfing without difficulty/mk  Back to gym by session 16    Goal Status:  Met    Staff Comments:  Mr. Gunderson has completed the Phase II Cardiac Rehabilitation Program.  He met the majority of the outcomes set for him as well as his personal goals.  He plans to continue to exercise at the local fitness center.  No cardiac complaints voiced.     Rehab Staff  Signature:  Jeanie Gill, MS, CCRP

## 2024-10-03 ENCOUNTER — APPOINTMENT (OUTPATIENT)
Dept: SLEEP MEDICINE | Facility: CLINIC | Age: 69
End: 2024-10-03
Payer: MEDICARE

## 2024-10-04 ENCOUNTER — APPOINTMENT (OUTPATIENT)
Dept: CARDIAC REHAB | Facility: CLINIC | Age: 69
End: 2024-10-04

## 2024-10-12 DIAGNOSIS — G47.00 INSOMNIA, UNSPECIFIED TYPE: ICD-10-CM

## 2024-10-14 DIAGNOSIS — M19.90 ARTHRITIS: Primary | ICD-10-CM

## 2024-10-14 RX ORDER — GABAPENTIN 100 MG/1
100 CAPSULE ORAL 2 TIMES DAILY
Qty: 60 CAPSULE | Refills: 0 | Status: SHIPPED | OUTPATIENT
Start: 2024-10-14 | End: 2024-11-13

## 2024-11-05 ENCOUNTER — APPOINTMENT (OUTPATIENT)
Dept: CARDIOLOGY | Facility: CLINIC | Age: 69
End: 2024-11-05
Payer: MEDICARE

## 2024-11-12 ENCOUNTER — APPOINTMENT (OUTPATIENT)
Dept: CARDIOLOGY | Facility: CLINIC | Age: 69
End: 2024-11-12
Payer: MEDICARE

## 2024-12-06 RX ORDER — TRAZODONE HYDROCHLORIDE 50 MG/1
50 TABLET ORAL NIGHTLY PRN
Qty: 90 TABLET | Refills: 1 | OUTPATIENT
Start: 2024-12-06 | End: 2025-12-06

## 2024-12-07 ENCOUNTER — LAB (OUTPATIENT)
Dept: LAB | Facility: LAB | Age: 69
End: 2024-12-07
Payer: MEDICARE

## 2024-12-07 DIAGNOSIS — I25.10 CORONARY ARTERY DISEASE INVOLVING NATIVE CORONARY ARTERY OF NATIVE HEART WITHOUT ANGINA PECTORIS: ICD-10-CM

## 2024-12-07 DIAGNOSIS — I10 HYPERTENSION, UNSPECIFIED TYPE: ICD-10-CM

## 2024-12-07 LAB
ANION GAP SERPL CALC-SCNC: 16 MMOL/L (ref 10–20)
APPEARANCE UR: CLEAR
BASOPHILS # BLD AUTO: 0.09 X10*3/UL (ref 0–0.1)
BASOPHILS NFR BLD AUTO: 1.4 %
BILIRUB UR STRIP.AUTO-MCNC: NEGATIVE MG/DL
BUN SERPL-MCNC: 20 MG/DL (ref 6–23)
CALCIUM SERPL-MCNC: 9.3 MG/DL (ref 8.6–10.3)
CHLORIDE SERPL-SCNC: 103 MMOL/L (ref 98–107)
CO2 SERPL-SCNC: 23 MMOL/L (ref 21–32)
COLOR UR: YELLOW
CREAT SERPL-MCNC: 1.45 MG/DL (ref 0.5–1.3)
EGFRCR SERPLBLD CKD-EPI 2021: 52 ML/MIN/1.73M*2
EOSINOPHIL # BLD AUTO: 0.1 X10*3/UL (ref 0–0.7)
EOSINOPHIL NFR BLD AUTO: 1.5 %
ERYTHROCYTE [DISTWIDTH] IN BLOOD BY AUTOMATED COUNT: 12.8 % (ref 11.5–14.5)
GLUCOSE SERPL-MCNC: 99 MG/DL (ref 74–99)
GLUCOSE UR STRIP.AUTO-MCNC: NORMAL MG/DL
HCT VFR BLD AUTO: 45.2 % (ref 41–52)
HGB BLD-MCNC: 14.1 G/DL (ref 13.5–17.5)
HYALINE CASTS #/AREA URNS AUTO: ABNORMAL /LPF
IMM GRANULOCYTES # BLD AUTO: 0.02 X10*3/UL (ref 0–0.7)
IMM GRANULOCYTES NFR BLD AUTO: 0.3 % (ref 0–0.9)
KETONES UR STRIP.AUTO-MCNC: NEGATIVE MG/DL
LEUKOCYTE ESTERASE UR QL STRIP.AUTO: NEGATIVE
LYMPHOCYTES # BLD AUTO: 1.54 X10*3/UL (ref 1.2–4.8)
LYMPHOCYTES NFR BLD AUTO: 23.3 %
MCH RBC QN AUTO: 27.7 PG (ref 26–34)
MCHC RBC AUTO-ENTMCNC: 31.2 G/DL (ref 32–36)
MCV RBC AUTO: 89 FL (ref 80–100)
MONOCYTES # BLD AUTO: 0.53 X10*3/UL (ref 0.1–1)
MONOCYTES NFR BLD AUTO: 8 %
MUCOUS THREADS #/AREA URNS AUTO: ABNORMAL /LPF
NEUTROPHILS # BLD AUTO: 4.33 X10*3/UL (ref 1.2–7.7)
NEUTROPHILS NFR BLD AUTO: 65.5 %
NITRITE UR QL STRIP.AUTO: NEGATIVE
NRBC BLD-RTO: 0 /100 WBCS (ref 0–0)
PH UR STRIP.AUTO: 5 [PH]
PLATELET # BLD AUTO: 204 X10*3/UL (ref 150–450)
POTASSIUM SERPL-SCNC: 4 MMOL/L (ref 3.5–5.3)
PROT UR STRIP.AUTO-MCNC: NEGATIVE MG/DL
RBC # BLD AUTO: 5.09 X10*6/UL (ref 4.5–5.9)
RBC # UR STRIP.AUTO: ABNORMAL /UL
RBC #/AREA URNS AUTO: ABNORMAL /HPF
SODIUM SERPL-SCNC: 138 MMOL/L (ref 136–145)
SP GR UR STRIP.AUTO: 1.02
UROBILINOGEN UR STRIP.AUTO-MCNC: NORMAL MG/DL
WBC # BLD AUTO: 6.6 X10*3/UL (ref 4.4–11.3)
WBC #/AREA URNS AUTO: ABNORMAL /HPF

## 2024-12-07 PROCEDURE — 36415 COLL VENOUS BLD VENIPUNCTURE: CPT

## 2024-12-07 PROCEDURE — 80048 BASIC METABOLIC PNL TOTAL CA: CPT

## 2024-12-07 PROCEDURE — 85025 COMPLETE CBC W/AUTO DIFF WBC: CPT

## 2024-12-07 PROCEDURE — 81001 URINALYSIS AUTO W/SCOPE: CPT

## 2024-12-12 ENCOUNTER — APPOINTMENT (OUTPATIENT)
Dept: PRIMARY CARE | Facility: CLINIC | Age: 69
End: 2024-12-12
Payer: MEDICARE

## 2024-12-12 VITALS
DIASTOLIC BLOOD PRESSURE: 76 MMHG | WEIGHT: 198 LBS | HEART RATE: 71 BPM | HEIGHT: 76 IN | BODY MASS INDEX: 24.11 KG/M2 | OXYGEN SATURATION: 98 % | SYSTOLIC BLOOD PRESSURE: 136 MMHG

## 2024-12-12 DIAGNOSIS — I25.10 CORONARY ARTERY DISEASE INVOLVING NATIVE CORONARY ARTERY OF NATIVE HEART WITHOUT ANGINA PECTORIS: ICD-10-CM

## 2024-12-12 DIAGNOSIS — E78.5 DYSLIPIDEMIA: ICD-10-CM

## 2024-12-12 DIAGNOSIS — G47.33 OSA ON CPAP: ICD-10-CM

## 2024-12-12 DIAGNOSIS — R31.29 MICROSCOPIC HEMATURIA: ICD-10-CM

## 2024-12-12 DIAGNOSIS — I10 BENIGN ESSENTIAL HYPERTENSION: Primary | ICD-10-CM

## 2024-12-12 PROCEDURE — 1123F ACP DISCUSS/DSCN MKR DOCD: CPT | Performed by: INTERNAL MEDICINE

## 2024-12-12 PROCEDURE — 3008F BODY MASS INDEX DOCD: CPT | Performed by: INTERNAL MEDICINE

## 2024-12-12 PROCEDURE — 3078F DIAST BP <80 MM HG: CPT | Performed by: INTERNAL MEDICINE

## 2024-12-12 PROCEDURE — 1158F ADVNC CARE PLAN TLK DOCD: CPT | Performed by: INTERNAL MEDICINE

## 2024-12-12 PROCEDURE — 3075F SYST BP GE 130 - 139MM HG: CPT | Performed by: INTERNAL MEDICINE

## 2024-12-12 PROCEDURE — 99214 OFFICE O/P EST MOD 30 MIN: CPT | Performed by: INTERNAL MEDICINE

## 2024-12-12 ASSESSMENT — PATIENT HEALTH QUESTIONNAIRE - PHQ9
1. LITTLE INTEREST OR PLEASURE IN DOING THINGS: NOT AT ALL
SUM OF ALL RESPONSES TO PHQ9 QUESTIONS 1 AND 2: 0
2. FEELING DOWN, DEPRESSED OR HOPELESS: NOT AT ALL

## 2024-12-12 ASSESSMENT — ENCOUNTER SYMPTOMS
LOSS OF SENSATION IN FEET: 0
OCCASIONAL FEELINGS OF UNSTEADINESS: 0
DEPRESSION: 0

## 2024-12-12 NOTE — PROGRESS NOTES
"Subjective   Patient ID: Paco Gunderson is a 69 y.o. male who presents for Follow-up and Results (Patient wants blood work results explained. ).    HPI patient presents to clinic for follow-up visit on history of n coronary artery disease status post angioplasty and drug-eluting stenting of proximal and mid LAD on 4/29/2024, obstructive sleep apnea, multiple colonic polyp, and tubular adenoma He  is doing well and denies any abdominal pain nausea vomiting fever chills flank pain and diaphoresis.  Laboratory studies done showed hemoglobin of 14.1, urine showed trace amount of blood with 1-2 RBCs 2+ hyaline cast, creatinine of 1.45 with GFR of 52 mL/min and other studies are unremarkable.  Ultrasound of abdominal aorta done at [facility] did not show any evidence of abdominal aortic aneurysm.    Review of Systems   Constitutional: Negative.    HENT: Negative.     Eyes: Negative.    Respiratory: Negative.     Cardiovascular: Negative.    Gastrointestinal: Negative.    Endocrine: Negative.    Genitourinary: Negative.    Musculoskeletal: Negative.    Skin: Negative.    Allergic/Immunologic: Negative.    Neurological: Negative.    Hematological: Negative.    Psychiatric/Behavioral: Negative.         Objective   /76 (BP Location: Right arm, Patient Position: Sitting)   Pulse 71   Ht 1.93 m (6' 4\")   Wt 89.8 kg (198 lb)   SpO2 98%   BMI 24.10 kg/m²     Physical Exam  Constitutional:       Appearance: Normal appearance. He is normal weight.   HENT:      Right Ear: Tympanic membrane normal.      Left Ear: Tympanic membrane and ear canal normal.      Nose: Nose normal.   Neck:      Vascular: No carotid bruit.   Cardiovascular:      Rate and Rhythm: Normal rate.   Pulmonary:      Effort: No respiratory distress.      Breath sounds: No stridor. No wheezing.   Abdominal:      Palpations: Abdomen is soft.      Tenderness: There is no abdominal tenderness. There is no guarding or rebound.   Skin:     Coloration: " Skin is not jaundiced.      Findings: No bruising.   Neurological:      General: No focal deficit present.      Mental Status: He is alert and oriented to person, place, and time.   Psychiatric:         Mood and Affect: Mood normal.         Assessment/Plan   Assessment & Plan  Benign essential hypertension  Patient advised to continue metoprolol regarding history of hypertension and coronary artery disease.  He will be scheduled for routine blood work and will return to clinic in 6 months for follow-up visit.  Orders:    Basic metabolic panel; Future    CBC and Auto Differential; Future    Coronary artery disease involving native coronary artery of native heart without angina pectoris  He will continue aspirin, Lipitor and Plavix regarding history of coronary artery disease.  He will also continue to follow-up with cardiology clinic.  Orders:    Lipid Panel; Future    NICOLÁS on CPAP  He is encouraged to continue using CPAP regarding obstructive sleep apnea.       Dyslipidemia  He will continue atorvastatin regarding dyslipidemia.  Orders:    Lipid Panel; Future    Microscopic hematuria  He will be scheduled for renal ultrasound regarding microscopic hematuria.  Orders:    US renal complete; Future    Urinalysis with Reflex Microscopic; Future

## 2024-12-13 NOTE — ASSESSMENT & PLAN NOTE
Patient advised to continue metoprolol regarding history of hypertension and coronary artery disease.  He will be scheduled for routine blood work and will return to clinic in 6 months for follow-up visit.  Orders:    Basic metabolic panel; Future    CBC and Auto Differential; Future

## 2024-12-15 ASSESSMENT — ENCOUNTER SYMPTOMS
MUSCULOSKELETAL NEGATIVE: 1
ENDOCRINE NEGATIVE: 1
RESPIRATORY NEGATIVE: 1
GASTROINTESTINAL NEGATIVE: 1
HEMATOLOGIC/LYMPHATIC NEGATIVE: 1
ALLERGIC/IMMUNOLOGIC NEGATIVE: 1
EYES NEGATIVE: 1
NEUROLOGICAL NEGATIVE: 1
CARDIOVASCULAR NEGATIVE: 1
CONSTITUTIONAL NEGATIVE: 1
PSYCHIATRIC NEGATIVE: 1

## 2025-01-09 DIAGNOSIS — J01.10 ACUTE NON-RECURRENT FRONTAL SINUSITIS: Primary | ICD-10-CM

## 2025-01-09 RX ORDER — LEVOCETIRIZINE DIHYDROCHLORIDE 5 MG/1
5 TABLET, FILM COATED ORAL EVERY EVENING
Qty: 14 TABLET | Refills: 0 | Status: SHIPPED | OUTPATIENT
Start: 2025-01-09 | End: 2025-01-23

## 2025-01-09 RX ORDER — CEFDINIR 300 MG/1
300 CAPSULE ORAL 2 TIMES DAILY
Qty: 14 CAPSULE | Refills: 0 | Status: SHIPPED | OUTPATIENT
Start: 2025-01-09 | End: 2025-01-16

## 2025-01-21 ENCOUNTER — TELEPHONE (OUTPATIENT)
Dept: PRIMARY CARE | Facility: CLINIC | Age: 70
End: 2025-01-21
Payer: MEDICARE

## 2025-04-02 DIAGNOSIS — I65.29 MILD ATHEROSCLEROSIS OF CAROTID ARTERY, UNSPECIFIED LATERALITY: Primary | ICD-10-CM

## 2025-04-02 DIAGNOSIS — I65.23 CAROTID ARTERY STENOSIS WITHOUT CEREBRAL INFARCTION, BILATERAL: ICD-10-CM

## 2025-04-22 ENCOUNTER — TELEPHONE (OUTPATIENT)
Dept: PRIMARY CARE | Facility: CLINIC | Age: 70
End: 2025-04-22
Payer: MEDICARE

## 2025-05-09 ENCOUNTER — HOSPITAL ENCOUNTER (OUTPATIENT)
Dept: RADIOLOGY | Facility: CLINIC | Age: 70
Discharge: HOME | End: 2025-05-09
Payer: MEDICARE

## 2025-05-09 DIAGNOSIS — I65.23 CAROTID ARTERY STENOSIS WITHOUT CEREBRAL INFARCTION, BILATERAL: ICD-10-CM

## 2025-05-09 DIAGNOSIS — I65.29 MILD ATHEROSCLEROSIS OF CAROTID ARTERY, UNSPECIFIED LATERALITY: ICD-10-CM

## 2025-05-09 PROCEDURE — 93880 EXTRACRANIAL BILAT STUDY: CPT

## 2025-05-09 PROCEDURE — 93880 EXTRACRANIAL BILAT STUDY: CPT | Performed by: RADIOLOGY

## 2025-06-05 ENCOUNTER — HOSPITAL ENCOUNTER (OUTPATIENT)
Dept: RADIOLOGY | Facility: CLINIC | Age: 70
Discharge: HOME | End: 2025-06-05
Payer: MEDICARE

## 2025-06-05 DIAGNOSIS — R31.29 MICROSCOPIC HEMATURIA: ICD-10-CM

## 2025-06-05 PROCEDURE — 76770 US EXAM ABDO BACK WALL COMP: CPT | Performed by: STUDENT IN AN ORGANIZED HEALTH CARE EDUCATION/TRAINING PROGRAM

## 2025-06-05 PROCEDURE — 76770 US EXAM ABDO BACK WALL COMP: CPT

## 2025-06-12 ENCOUNTER — APPOINTMENT (OUTPATIENT)
Dept: RADIOLOGY | Facility: CLINIC | Age: 70
End: 2025-06-12
Payer: MEDICARE

## 2025-06-12 ENCOUNTER — APPOINTMENT (OUTPATIENT)
Dept: PRIMARY CARE | Facility: CLINIC | Age: 70
End: 2025-06-12
Payer: MEDICARE

## 2025-06-12 VITALS
DIASTOLIC BLOOD PRESSURE: 61 MMHG | BODY MASS INDEX: 24.21 KG/M2 | SYSTOLIC BLOOD PRESSURE: 117 MMHG | WEIGHT: 198.85 LBS | OXYGEN SATURATION: 97 % | RESPIRATION RATE: 18 BRPM | HEART RATE: 66 BPM

## 2025-06-12 DIAGNOSIS — Z00.00 ROUTINE GENERAL MEDICAL EXAMINATION AT HEALTH CARE FACILITY: Primary | ICD-10-CM

## 2025-06-12 DIAGNOSIS — I65.29 STENOSIS OF CAROTID ARTERY, UNSPECIFIED LATERALITY: ICD-10-CM

## 2025-06-12 DIAGNOSIS — N18.31 CHRONIC KIDNEY DISEASE, STAGE 3A (MULTI): ICD-10-CM

## 2025-06-12 DIAGNOSIS — K40.90 LEFT INGUINAL HERNIA: ICD-10-CM

## 2025-06-12 DIAGNOSIS — I10 BENIGN ESSENTIAL HYPERTENSION: ICD-10-CM

## 2025-06-12 DIAGNOSIS — I25.10 CORONARY ARTERY DISEASE INVOLVING NATIVE CORONARY ARTERY OF NATIVE HEART WITHOUT ANGINA PECTORIS: ICD-10-CM

## 2025-06-12 DIAGNOSIS — K63.5 POLYP OF COLON, UNSPECIFIED PART OF COLON, UNSPECIFIED TYPE: ICD-10-CM

## 2025-06-12 DIAGNOSIS — M17.0 OSTEOARTHRITIS OF BOTH KNEES, UNSPECIFIED OSTEOARTHRITIS TYPE: ICD-10-CM

## 2025-06-12 DIAGNOSIS — R31.29 MICROSCOPIC HEMATURIA: ICD-10-CM

## 2025-06-12 PROCEDURE — G0439 PPPS, SUBSEQ VISIT: HCPCS | Performed by: INTERNAL MEDICINE

## 2025-06-12 PROCEDURE — 1159F MED LIST DOCD IN RCRD: CPT | Performed by: INTERNAL MEDICINE

## 2025-06-12 PROCEDURE — 1160F RVW MEDS BY RX/DR IN RCRD: CPT | Performed by: INTERNAL MEDICINE

## 2025-06-12 PROCEDURE — 1125F AMNT PAIN NOTED PAIN PRSNT: CPT | Performed by: INTERNAL MEDICINE

## 2025-06-12 PROCEDURE — 99214 OFFICE O/P EST MOD 30 MIN: CPT | Performed by: INTERNAL MEDICINE

## 2025-06-12 PROCEDURE — 1170F FXNL STATUS ASSESSED: CPT | Performed by: INTERNAL MEDICINE

## 2025-06-12 PROCEDURE — 3078F DIAST BP <80 MM HG: CPT | Performed by: INTERNAL MEDICINE

## 2025-06-12 PROCEDURE — 3074F SYST BP LT 130 MM HG: CPT | Performed by: INTERNAL MEDICINE

## 2025-06-12 PROCEDURE — 1123F ACP DISCUSS/DSCN MKR DOCD: CPT | Performed by: INTERNAL MEDICINE

## 2025-06-12 RX ORDER — DICLOFENAC SODIUM 10 MG/G
4 GEL TOPICAL 4 TIMES DAILY
Qty: 100 G | Refills: 1 | Status: SHIPPED | OUTPATIENT
Start: 2025-06-12

## 2025-06-12 ASSESSMENT — ENCOUNTER SYMPTOMS
OCCASIONAL FEELINGS OF UNSTEADINESS: 1
LOSS OF SENSATION IN FEET: 1
DEPRESSION: 0

## 2025-06-12 ASSESSMENT — ACTIVITIES OF DAILY LIVING (ADL)
DOING_HOUSEWORK: INDEPENDENT
GROCERY_SHOPPING: INDEPENDENT
BATHING: INDEPENDENT
TAKING_MEDICATION: INDEPENDENT
DRESSING: INDEPENDENT
MANAGING_FINANCES: INDEPENDENT

## 2025-06-12 ASSESSMENT — PATIENT HEALTH QUESTIONNAIRE - PHQ9
SUM OF ALL RESPONSES TO PHQ9 QUESTIONS 1 AND 2: 0
1. LITTLE INTEREST OR PLEASURE IN DOING THINGS: NOT AT ALL
1. LITTLE INTEREST OR PLEASURE IN DOING THINGS: NOT AT ALL
SUM OF ALL RESPONSES TO PHQ9 QUESTIONS 1 AND 2: 0
2. FEELING DOWN, DEPRESSED OR HOPELESS: NOT AT ALL
2. FEELING DOWN, DEPRESSED OR HOPELESS: NOT AT ALL

## 2025-06-12 ASSESSMENT — LIFESTYLE VARIABLES
HOW OFTEN DO YOU HAVE SIX OR MORE DRINKS ON ONE OCCASION: NEVER
HOW OFTEN DO YOU HAVE A DRINK CONTAINING ALCOHOL: MONTHLY OR LESS
AUDIT-C TOTAL SCORE: 1
HOW MANY STANDARD DRINKS CONTAINING ALCOHOL DO YOU HAVE ON A TYPICAL DAY: 1 OR 2
SKIP TO QUESTIONS 9-10: 1
AUDIT-C TOTAL SCORE: 1

## 2025-06-12 ASSESSMENT — PAIN SCALES - GENERAL: PAINLEVEL_OUTOF10: 2

## 2025-06-12 NOTE — ASSESSMENT & PLAN NOTE
He will continue metoprolol regarding history of hypertension.  Orders:    CBC and Auto Differential; Future    Basic metabolic panel; Future    Urinalysis with Reflex Microscopic; Future     Other:

## 2025-06-12 NOTE — PROGRESS NOTES
Subjective   Reason for Visit: Paco Gunderson is an 69 y.o. male here for a Medicare Wellness visit.     Past Medical, Surgical, and Family History reviewed and updated in chart.    Reviewed all medications by prescribing practitioner or clinical pharmacist (such as prescriptions, OTCs, herbal therapies and supplements) and documented in the medical record.    HPI patient presents to clinic for medicare  annual wellness exam and follow-up visit, on history of  coronary artery disease ,status post angioplasty and drug-eluting of proximal and mid LAD on 4/29/2024, obstructive sleep apnea, multiple colonic polyp, and tubular adenoma  ,colonoscopy on 3/12/24 showed colonic polyps.  He is doing well and is still concerned about left inguinal hernia.  He denies any abdominal pain, fever or chills chest pain nausea vomiting or GI bleeding laboratory studies done shows serum cholesterol 110 triglycerides of 65 and other studies are unremarkable.  Renal ultrasound done showed some thickening of the bladder wall without any hydronephrosis or nephrolithiasis    Patient Care Team:  Curt Calvo MD as PCP - General (Internal Medicine)  Curt Calvo MD as PCP - [employer] PCP     Review of Systems   Constitutional: Negative.    HENT: Negative.     Eyes: Negative.    Respiratory: Negative.     Cardiovascular: Negative.    Gastrointestinal: Negative.    Endocrine: Negative.    Genitourinary: Negative.    Musculoskeletal: Negative.    Skin: Negative.    Allergic/Immunologic: Negative.    Neurological: Negative.    Hematological: Negative.    Psychiatric/Behavioral: Negative.         Objective   Vitals:  /61 (BP Location: Left arm, Patient Position: Sitting)   Pulse 66   Resp 18   Wt 90.2 kg (198 lb 13.7 oz)   SpO2 97%   BMI 24.21 kg/m²       Physical Exam  Constitutional:       Appearance: Normal appearance. He is normal weight.   HENT:      Right Ear: Tympanic membrane normal.      Left Ear: Tympanic  membrane and ear canal normal.      Nose: Nose normal.   Neck:      Vascular: No carotid bruit.   Cardiovascular:      Rate and Rhythm: Normal rate.   Pulmonary:      Effort: No respiratory distress.      Breath sounds: No stridor. No wheezing.   Abdominal:      Palpations: Abdomen is soft.      Tenderness: There is no abdominal tenderness. There is no guarding or rebound.   Skin:     Coloration: Skin is not jaundiced.      Findings: No bruising.   Neurological:      General: No focal deficit present.      Mental Status: He is alert and oriented to person, place, and time.   Psychiatric:         Mood and Affect: Mood normal.         Assessment & Plan  Routine general medical examination at health care facility  Patient will be scheduled for routine blood work and is refusing to have vaccination for health maintenance.  Orders:    1 Year Follow Up In Primary Care - Wellness Exam    1 Year Follow Up In Primary Care - Wellness Exam; Future    Benign essential hypertension  He will continue metoprolol regarding history of hypertension.  Orders:    CBC and Auto Differential; Future    Basic metabolic panel; Future    Urinalysis with Reflex Microscopic; Future    Left inguinal hernia  He will be referred to general surgery regarding left inguinal hernia.       Coronary artery disease involving native coronary artery of native heart without angina pectoris  He will continue metoprolol aspirin and atorvastatin regarding coronary artery disease and will also follow-up with cardiology clinic.       Microscopic hematuria  He will follow-up with urology clinic regarding microscopic hematuria.  Orders:    Referral to General Surgery; Future    Urinalysis with Reflex Microscopic; Future    Stenosis of carotid artery, unspecified laterality  He will continue aspirin and atorvastatin.         Polyp of colon, unspecified part of colon, unspecified type  Will need repeat cscope in 2027       Osteoarthritis of both knees, unspecified  osteoarthritis type  He is advised to try diclofenac gel regarding arthritis.  Orders:    diclofenac sodium (Voltaren) 1 % gel; Apply 4.5 inches (4 g) topically 4 times a day.           Alcohol Use Counseling   were spent counseling the patient and offering support/resources on alcohol use disorder.    Depression Screening   were spent screening for depression.

## 2025-06-17 PROBLEM — N18.31 CHRONIC KIDNEY DISEASE, STAGE 3A (MULTI): Status: ACTIVE | Noted: 2025-06-17

## 2025-06-17 ASSESSMENT — ENCOUNTER SYMPTOMS
NEUROLOGICAL NEGATIVE: 1
EYES NEGATIVE: 1
MUSCULOSKELETAL NEGATIVE: 1
HEMATOLOGIC/LYMPHATIC NEGATIVE: 1
GASTROINTESTINAL NEGATIVE: 1
CONSTITUTIONAL NEGATIVE: 1
CARDIOVASCULAR NEGATIVE: 1
RESPIRATORY NEGATIVE: 1
ALLERGIC/IMMUNOLOGIC NEGATIVE: 1
PSYCHIATRIC NEGATIVE: 1
ENDOCRINE NEGATIVE: 1

## 2025-12-12 ENCOUNTER — APPOINTMENT (OUTPATIENT)
Dept: PRIMARY CARE | Facility: CLINIC | Age: 70
End: 2025-12-12
Payer: MEDICARE

## 2026-06-12 ENCOUNTER — APPOINTMENT (OUTPATIENT)
Dept: PRIMARY CARE | Facility: CLINIC | Age: 71
End: 2026-06-12
Payer: MEDICARE